# Patient Record
Sex: MALE | Race: BLACK OR AFRICAN AMERICAN | Employment: OTHER | URBAN - METROPOLITAN AREA
[De-identification: names, ages, dates, MRNs, and addresses within clinical notes are randomized per-mention and may not be internally consistent; named-entity substitution may affect disease eponyms.]

---

## 2022-11-28 ENCOUNTER — HOSPITAL ENCOUNTER (EMERGENCY)
Age: 62
Discharge: HOME | End: 2022-11-29
Attending: EMERGENCY MEDICINE
Payer: COMMERCIAL

## 2022-11-28 VITALS
DIASTOLIC BLOOD PRESSURE: 77 MMHG | RESPIRATION RATE: 18 BRPM | OXYGEN SATURATION: 100 % | WEIGHT: 283.9 LBS | SYSTOLIC BLOOD PRESSURE: 118 MMHG | HEART RATE: 99 BPM | TEMPERATURE: 97.5 F

## 2022-11-28 DIAGNOSIS — R11.2 NAUSEA AND VOMITING, UNSPECIFIED VOMITING TYPE: Primary | ICD-10-CM

## 2022-11-28 PROCEDURE — 96374 THER/PROPH/DIAG INJ IV PUSH: CPT

## 2022-11-28 PROCEDURE — 99284 EMERGENCY DEPT VISIT MOD MDM: CPT

## 2022-11-28 RX ORDER — AMLODIPINE BESYLATE 10 MG/1
TABLET ORAL
Status: ON HOLD | COMMUNITY

## 2022-11-28 RX ORDER — ALLOPURINOL 300 MG/1
TABLET ORAL
Status: ON HOLD | COMMUNITY

## 2022-11-28 ASSESSMENT — PAIN SCALES - GENERAL: PAINLEVEL_OUTOF10: 3

## 2022-11-28 ASSESSMENT — PAIN - FUNCTIONAL ASSESSMENT: PAIN_FUNCTIONAL_ASSESSMENT: 0-10

## 2022-11-28 ASSESSMENT — PAIN DESCRIPTION - LOCATION: LOCATION: ABDOMEN

## 2022-11-29 LAB
ABSOLUTE EOS #: 0.12 K/UL (ref 0–0.44)
ABSOLUTE IMMATURE GRANULOCYTE: 0.02 K/UL (ref 0–0.3)
ABSOLUTE LYMPH #: 1.25 K/UL (ref 1.1–3.7)
ABSOLUTE MONO #: 0.59 K/UL (ref 0.1–1.2)
ALBUMIN SERPL-MCNC: 3.6 G/DL (ref 3.5–5.2)
ALP BLD-CCNC: 72 U/L (ref 40–129)
ALT SERPL-CCNC: 17 U/L (ref 5–41)
ANION GAP SERPL CALCULATED.3IONS-SCNC: 9 MMOL/L (ref 9–17)
AST SERPL-CCNC: 21 U/L
BASOPHILS # BLD: 0 % (ref 0–2)
BASOPHILS ABSOLUTE: <0.03 K/UL (ref 0–0.2)
BILIRUB SERPL-MCNC: 0.5 MG/DL (ref 0.3–1.2)
BUN BLDV-MCNC: 11 MG/DL (ref 8–23)
BUN/CREAT BLD: 7 (ref 9–20)
CALCIUM SERPL-MCNC: 8.9 MG/DL (ref 8.6–10.4)
CHLORIDE BLD-SCNC: 99 MMOL/L (ref 98–107)
CO2: 26 MMOL/L (ref 20–31)
CREAT SERPL-MCNC: 1.61 MG/DL (ref 0.7–1.2)
EOSINOPHILS RELATIVE PERCENT: 3 % (ref 1–4)
GFR SERPL CREATININE-BSD FRML MDRD: 47 ML/MIN/1.73M2
GLUCOSE BLD-MCNC: 101 MG/DL (ref 70–99)
HCT VFR BLD CALC: 36.1 % (ref 40.7–50.3)
HEMOGLOBIN: 11.4 G/DL (ref 13–17)
IMMATURE GRANULOCYTES: 0 %
LACTIC ACID: 1 MMOL/L (ref 0.5–2.2)
LYMPHOCYTES # BLD: 28 % (ref 24–43)
MCH RBC QN AUTO: 29.6 PG (ref 25.2–33.5)
MCHC RBC AUTO-ENTMCNC: 31.6 G/DL (ref 28.4–34.8)
MCV RBC AUTO: 93.8 FL (ref 82.6–102.9)
MONOCYTES # BLD: 13 % (ref 3–12)
NRBC AUTOMATED: 0 PER 100 WBC
PDW BLD-RTO: 14.6 % (ref 11.8–14.4)
PLATELET # BLD: 302 K/UL (ref 138–453)
PMV BLD AUTO: 8.9 FL (ref 8.1–13.5)
POTASSIUM SERPL-SCNC: 3.9 MMOL/L (ref 3.7–5.3)
RBC # BLD: 3.85 M/UL (ref 4.21–5.77)
RBC # BLD: ABNORMAL 10*6/UL
SEG NEUTROPHILS: 56 % (ref 36–65)
SEGMENTED NEUTROPHILS ABSOLUTE COUNT: 2.55 K/UL (ref 1.5–8.1)
SODIUM BLD-SCNC: 134 MMOL/L (ref 135–144)
TOTAL PROTEIN: 7.1 G/DL (ref 6.4–8.3)
WBC # BLD: 4.6 K/UL (ref 3.5–11.3)

## 2022-11-29 PROCEDURE — 85025 COMPLETE CBC W/AUTO DIFF WBC: CPT

## 2022-11-29 PROCEDURE — 6360000002 HC RX W HCPCS: Performed by: EMERGENCY MEDICINE

## 2022-11-29 PROCEDURE — 83605 ASSAY OF LACTIC ACID: CPT

## 2022-11-29 PROCEDURE — 2580000003 HC RX 258: Performed by: EMERGENCY MEDICINE

## 2022-11-29 PROCEDURE — 80053 COMPREHEN METABOLIC PANEL: CPT

## 2022-11-29 RX ORDER — 0.9 % SODIUM CHLORIDE 0.9 %
500 INTRAVENOUS SOLUTION INTRAVENOUS ONCE
Status: COMPLETED | OUTPATIENT
Start: 2022-11-29 | End: 2022-11-29

## 2022-11-29 RX ORDER — ONDANSETRON 4 MG/1
4 TABLET, ORALLY DISINTEGRATING ORAL 3 TIMES DAILY PRN
Qty: 21 TABLET | Refills: 0 | Status: ON HOLD | OUTPATIENT
Start: 2022-11-29

## 2022-11-29 RX ORDER — ONDANSETRON 2 MG/ML
4 INJECTION INTRAMUSCULAR; INTRAVENOUS ONCE
Status: COMPLETED | OUTPATIENT
Start: 2022-11-29 | End: 2022-11-29

## 2022-11-29 RX ADMIN — ONDANSETRON 4 MG: 2 INJECTION INTRAMUSCULAR; INTRAVENOUS at 01:14

## 2022-11-29 RX ADMIN — SODIUM CHLORIDE 500 ML: 9 INJECTION, SOLUTION INTRAVENOUS at 01:02

## 2022-11-29 ASSESSMENT — ENCOUNTER SYMPTOMS
CONSTIPATION: 0
CHEST TIGHTNESS: 0
SINUS PAIN: 0
EYES NEGATIVE: 1
DIARRHEA: 0
SHORTNESS OF BREATH: 0
APNEA: 0
ABDOMINAL DISTENTION: 0
COLOR CHANGE: 0
VOMITING: 1

## 2022-11-29 NOTE — ED PROVIDER NOTES
EMERGENCY DEPARTMENT ENCOUNTER    Pt Name: Jeannine Leon  MRN: [id number]  [place] 1960  Date of evaluation: 11/29/22  CHIEF COMPLAINT       Chief Complaint   Patient presents with    Emesis     Since Tuesday. HISTORY OF PRESENT ILLNESS   56-year-old male presents emergency room for vomiting. Patient reports he has not been able to keep anything down for almost a week now. He reports no matter what he eats he throws it up several minutes later. Patient reports history of high blood pressure no other major medical problems. He does not have diabetes. He is not having any abdominal pain. No fevers. He just cannot keep anything down. No diarrhea. REVIEW OF SYSTEMS     Review of Systems   Constitutional:  Negative for activity change, chills and diaphoresis. HENT:  Negative for congestion, sinus pain and tinnitus. Eyes: Negative. Respiratory:  Negative for apnea, chest tightness and shortness of breath. Gastrointestinal:  Positive for vomiting. Negative for abdominal distention, constipation and diarrhea. Genitourinary:  Negative for difficulty urinating and frequency. Musculoskeletal:  Negative for arthralgias and myalgias. Skin:  Negative for color change and rash. Neurological:  Negative for dizziness. Hematological: Negative. Psychiatric/Behavioral: Negative. PASTMEDICAL HISTORY     Past Medical History:   Diagnosis Date    Gout     Hypertension      Past Problem List  There is no problem list on file for this patient. SURGICAL HISTORY     History reviewed. No pertinent surgical history. CURRENT MEDICATIONS       Previous Medications    ALLOPURINOL (ZYLOPRIM) 300 MG TABLET    allopurinol 300 mg tablet    AMLODIPINE (NORVASC) 10 MG TABLET    amlodipine 10 mg tablet     ALLERGIES     has No Known Allergies. FAMILY HISTORY     has no family status information on file.       SOCIAL HISTORY       Social History     Tobacco Use    Smoking status: Never Smokeless tobacco: Never   Substance Use Topics    Alcohol use: Yes     Alcohol/week: 34.0 standard drinks     Types: 14 Cans of beer, 20 Shots of liquor per week    Drug use: Yes     Frequency: 1.0 times per week     Types: Marijuana (Weed)     PHYSICAL EXAM     INITIAL VITALS: /77   Pulse 99   Temp 97.5 °F (36.4 °C) (Temporal)   Resp 18   Wt 283 lb 14.4 oz (128.8 kg)   SpO2 100%    Physical Exam  Constitutional:       General: He is not in acute distress. Appearance: He is well-developed. HENT:      Head: Normocephalic. Eyes:      Pupils: Pupils are equal, round, and reactive to light. Cardiovascular:      Rate and Rhythm: Normal rate and regular rhythm. Heart sounds: Normal heart sounds. Pulmonary:      Effort: Pulmonary effort is normal. No respiratory distress. Breath sounds: Normal breath sounds. Abdominal:      General: Bowel sounds are normal.      Palpations: Abdomen is soft. Tenderness: There is no abdominal tenderness. Musculoskeletal:         General: Normal range of motion. Skin:     General: Skin is warm and dry. Neurological:      Mental Status: He is alert and oriented to person, place, and time. MEDICAL DECISION MAKIN-year-old male presenting to the emergency room for vomiting without abdominal pain. Patient does not have any abdominal tenderness. He has very mild YECENIA with creatinine of 1.61 and GFR of 47. Electrolytes are within acceptable limits. Given lack of abdominal pain CT imaging unlikely to provide additional information. Patient's liver enzymes and bilirubin are normal.  Patient tolerating fluids. We discussed clear fluids over the next 48 hours and reevaluation if symptoms do not improve after this.     CRITICAL CARE:       PROCEDURES:    Procedures    DIAGNOSTIC RESULTS   EKG:All EKG's are interpreted by the Emergency Department Physician who either signs or Co-signs this chart in the absence of a cardiologist.        RADIOLOGY:All plain film, CT, MRI, and formal ultrasound images (except ED bedside ultrasound) are read by the radiologist, see reports below, unless otherwisenoted in MDM or here. No orders to display     LABS: All lab results were reviewed by myself, and all abnormals are listed below. Labs Reviewed   CBC WITH AUTO DIFFERENTIAL - Abnormal; Notable for the following components:       Result Value    RBC 3.85 (*)     Hemoglobin 11.4 (*)     Hematocrit 36.1 (*)     RDW 14.6 (*)     Monocytes 13 (*)     All other components within normal limits   COMPREHENSIVE METABOLIC PANEL - Abnormal; Notable for the following components:    Glucose 101 (*)     Creatinine 1.61 (*)     Est, Glom Filt Rate 47 (*)     Bun/Cre Ratio 7 (*)     Sodium 134 (*)     All other components within normal limits   LACTIC ACID       EMERGENCY DEPARTMENTCOURSE:         Vitals:    Vitals:    11/28/22 2122 11/28/22 2128   BP: 118/77    Pulse: 99    Resp: 18    Temp: 97.5 °F (36.4 °C)    TempSrc: Temporal    SpO2: 100%    Weight:  283 lb 14.4 oz (128.8 kg)       The patient was given the following medications while in the emergency department:  Orders Placed This Encounter   Medications    0.9 % sodium chloride bolus    ondansetron (ZOFRAN) injection 4 mg    ondansetron (ZOFRAN-ODT) 4 MG disintegrating tablet     Sig: Take 1 tablet by mouth 3 times daily as needed for Nausea or Vomiting     Dispense:  21 tablet     Refill:  0     CONSULTS:  None    FINAL IMPRESSION      1. Nausea and vomiting, unspecified vomiting type          DISPOSITION/PLAN   DISPOSITION Decision To Discharge 11/29/2022 03:37:34 AM      PATIENT REFERRED TO:  No follow-up provider specified.   DISCHARGE MEDICATIONS:  New Prescriptions    ONDANSETRON (ZOFRAN-ODT) 4 MG DISINTEGRATING TABLET    Take 1 tablet by mouth 3 times daily as needed for Nausea or Vomiting     Lily Post MD  Attending Emergency Physician      Care during this encounter was due to an unprecedented national emergency due to COVID-19.              Tiki Bishop MD  11/29/22 0121

## 2022-11-29 NOTE — DISCHARGE INSTRUCTIONS
As we discussed I recommend sticking to clear fluids today. You may use apple juice Jell-O popsicles Gatorade chicken broth etc.  If appetite returns you may start with very light soup or crackers or toast.  Advance diet slowly. If you are unable to take in any solids in 3 days I recommend reevaluation.

## 2022-12-04 ENCOUNTER — APPOINTMENT (OUTPATIENT)
Dept: CT IMAGING | Age: 62
DRG: 469 | End: 2022-12-04
Payer: COMMERCIAL

## 2022-12-04 ENCOUNTER — HOSPITAL ENCOUNTER (INPATIENT)
Age: 62
LOS: 2 days | Discharge: HOME | DRG: 469 | End: 2022-12-06
Attending: EMERGENCY MEDICINE | Admitting: INTERNAL MEDICINE
Payer: COMMERCIAL

## 2022-12-04 DIAGNOSIS — N17.9 ACUTE KIDNEY INJURY (HCC): ICD-10-CM

## 2022-12-04 DIAGNOSIS — R11.2 NAUSEA AND VOMITING, UNSPECIFIED VOMITING TYPE: Primary | ICD-10-CM

## 2022-12-04 PROBLEM — E86.0 DEHYDRATION: Status: ACTIVE | Noted: 2022-12-04

## 2022-12-04 PROBLEM — M10.9 GOUT: Status: ACTIVE | Noted: 2020-09-04

## 2022-12-04 PROBLEM — I10 HYPERTENSIVE DISORDER: Status: ACTIVE | Noted: 2017-11-28

## 2022-12-04 PROBLEM — F10.10 ALCOHOL ABUSE: Chronic | Status: ACTIVE | Noted: 2022-12-04

## 2022-12-04 LAB
ABSOLUTE EOS #: <0.03 K/UL (ref 0–0.44)
ABSOLUTE IMMATURE GRANULOCYTE: 0.01 K/UL (ref 0–0.3)
ABSOLUTE LYMPH #: 0.89 K/UL (ref 1.1–3.7)
ABSOLUTE MONO #: 0.88 K/UL (ref 0.1–1.2)
ALBUMIN SERPL-MCNC: 3.2 G/DL (ref 3.5–5.2)
ALP BLD-CCNC: 69 U/L (ref 40–129)
ALT SERPL-CCNC: 19 U/L (ref 5–41)
AMYLASE: 187 U/L (ref 28–100)
ANION GAP SERPL CALCULATED.3IONS-SCNC: 11 MMOL/L (ref 9–17)
AST SERPL-CCNC: 42 U/L
BASOPHILS # BLD: 0 % (ref 0–2)
BASOPHILS ABSOLUTE: <0.03 K/UL (ref 0–0.2)
BILIRUB SERPL-MCNC: 0.3 MG/DL (ref 0.3–1.2)
BILIRUBIN DIRECT: 0.1 MG/DL
BILIRUBIN URINE: ABNORMAL
BILIRUBIN, INDIRECT: 0.2 MG/DL (ref 0–1)
BUN BLDV-MCNC: 12 MG/DL (ref 8–23)
BUN/CREAT BLD: 7 (ref 9–20)
CALCIUM SERPL-MCNC: 8.2 MG/DL (ref 8.6–10.4)
CASTS UA: ABNORMAL /LPF
CASTS UA: ABNORMAL /LPF
CHLORIDE BLD-SCNC: 99 MMOL/L (ref 98–107)
CO2: 25 MMOL/L (ref 20–31)
COLOR: YELLOW
CREAT SERPL-MCNC: 1.79 MG/DL (ref 0.7–1.2)
EOSINOPHILS RELATIVE PERCENT: 0 % (ref 1–4)
EPITHELIAL CELLS UA: ABNORMAL /HPF (ref 0–5)
ETHANOL PERCENT: <0.01 %
ETHANOL: <10 MG/DL
GFR SERPL CREATININE-BSD FRML MDRD: 42 ML/MIN/1.73M2
GLUCOSE BLD-MCNC: 104 MG/DL (ref 70–99)
GLUCOSE URINE: NEGATIVE
HCT VFR BLD CALC: 35.5 % (ref 40.7–50.3)
HEMOGLOBIN: 11.3 G/DL (ref 13–17)
IMMATURE GRANULOCYTES: 0 %
KETONES, URINE: ABNORMAL
LEUKOCYTE ESTERASE, URINE: NEGATIVE
LIPASE: 47 U/L (ref 13–60)
LYMPHOCYTES # BLD: 22 % (ref 24–43)
MCH RBC QN AUTO: 29.8 PG (ref 25.2–33.5)
MCHC RBC AUTO-ENTMCNC: 31.8 G/DL (ref 28.4–34.8)
MCV RBC AUTO: 93.7 FL (ref 82.6–102.9)
MONOCYTES # BLD: 22 % (ref 3–12)
MUCUS: ABNORMAL
NITRITE, URINE: NEGATIVE
NRBC AUTOMATED: 0 PER 100 WBC
PDW BLD-RTO: 14.9 % (ref 11.8–14.4)
PH UA: 5.5 (ref 5–8)
PLATELET # BLD: 215 K/UL (ref 138–453)
PMV BLD AUTO: 9.5 FL (ref 8.1–13.5)
POTASSIUM SERPL-SCNC: 4.2 MMOL/L (ref 3.7–5.3)
PROTEIN UA: ABNORMAL
RBC # BLD: 3.79 M/UL (ref 4.21–5.77)
RBC # BLD: ABNORMAL 10*6/UL
RBC UA: ABNORMAL /HPF (ref 0–2)
REASON FOR REJECTION: NORMAL
SEG NEUTROPHILS: 56 % (ref 36–65)
SEGMENTED NEUTROPHILS ABSOLUTE COUNT: 2.21 K/UL (ref 1.5–8.1)
SODIUM BLD-SCNC: 135 MMOL/L (ref 135–144)
SPECIFIC GRAVITY UA: 1.02 (ref 1–1.03)
TOTAL PROTEIN: 6.6 G/DL (ref 6.4–8.3)
TURBIDITY: ABNORMAL
URINE HGB: ABNORMAL
UROBILINOGEN, URINE: NORMAL
WBC # BLD: 4 K/UL (ref 3.5–11.3)
WBC UA: ABNORMAL /HPF (ref 0–5)
ZZ NTE CLEAN UP: ORDERED TEST: NORMAL
ZZ NTE WITH NAME CLEAN UP: SPECIMEN SOURCE: NORMAL

## 2022-12-04 PROCEDURE — 96361 HYDRATE IV INFUSION ADD-ON: CPT

## 2022-12-04 PROCEDURE — 80076 HEPATIC FUNCTION PANEL: CPT

## 2022-12-04 PROCEDURE — 80048 BASIC METABOLIC PNL TOTAL CA: CPT

## 2022-12-04 PROCEDURE — 6360000002 HC RX W HCPCS: Performed by: NURSE PRACTITIONER

## 2022-12-04 PROCEDURE — 83690 ASSAY OF LIPASE: CPT

## 2022-12-04 PROCEDURE — 2580000003 HC RX 258: Performed by: NURSE PRACTITIONER

## 2022-12-04 PROCEDURE — G0480 DRUG TEST DEF 1-7 CLASSES: HCPCS

## 2022-12-04 PROCEDURE — 2580000003 HC RX 258: Performed by: EMERGENCY MEDICINE

## 2022-12-04 PROCEDURE — 2500000003 HC RX 250 WO HCPCS: Performed by: NURSE PRACTITIONER

## 2022-12-04 PROCEDURE — 99222 1ST HOSP IP/OBS MODERATE 55: CPT | Performed by: NURSE PRACTITIONER

## 2022-12-04 PROCEDURE — 81001 URINALYSIS AUTO W/SCOPE: CPT

## 2022-12-04 PROCEDURE — 6360000002 HC RX W HCPCS: Performed by: EMERGENCY MEDICINE

## 2022-12-04 PROCEDURE — 84300 ASSAY OF URINE SODIUM: CPT

## 2022-12-04 PROCEDURE — 96374 THER/PROPH/DIAG INJ IV PUSH: CPT

## 2022-12-04 PROCEDURE — 1200000000 HC SEMI PRIVATE

## 2022-12-04 PROCEDURE — 85025 COMPLETE CBC W/AUTO DIFF WBC: CPT

## 2022-12-04 PROCEDURE — 74176 CT ABD & PELVIS W/O CONTRAST: CPT

## 2022-12-04 PROCEDURE — 82150 ASSAY OF AMYLASE: CPT

## 2022-12-04 PROCEDURE — 99285 EMERGENCY DEPT VISIT HI MDM: CPT

## 2022-12-04 RX ORDER — ACETAMINOPHEN 325 MG/1
650 TABLET ORAL EVERY 6 HOURS PRN
Status: DISCONTINUED | OUTPATIENT
Start: 2022-12-04 | End: 2022-12-06 | Stop reason: HOSPADM

## 2022-12-04 RX ORDER — SODIUM CHLORIDE 0.9 % (FLUSH) 0.9 %
5-40 SYRINGE (ML) INJECTION EVERY 12 HOURS SCHEDULED
Status: DISCONTINUED | OUTPATIENT
Start: 2022-12-04 | End: 2022-12-06 | Stop reason: HOSPADM

## 2022-12-04 RX ORDER — POTASSIUM CHLORIDE 7.45 MG/ML
10 INJECTION INTRAVENOUS PRN
Status: DISCONTINUED | OUTPATIENT
Start: 2022-12-04 | End: 2022-12-06 | Stop reason: HOSPADM

## 2022-12-04 RX ORDER — ONDANSETRON 4 MG/1
4 TABLET, ORALLY DISINTEGRATING ORAL EVERY 8 HOURS PRN
Status: DISCONTINUED | OUTPATIENT
Start: 2022-12-04 | End: 2022-12-06 | Stop reason: HOSPADM

## 2022-12-04 RX ORDER — MAGNESIUM SULFATE 1 G/100ML
1000 INJECTION INTRAVENOUS PRN
Status: DISCONTINUED | OUTPATIENT
Start: 2022-12-04 | End: 2022-12-06 | Stop reason: HOSPADM

## 2022-12-04 RX ORDER — ACETAMINOPHEN 650 MG/1
650 SUPPOSITORY RECTAL EVERY 6 HOURS PRN
Status: DISCONTINUED | OUTPATIENT
Start: 2022-12-04 | End: 2022-12-06 | Stop reason: HOSPADM

## 2022-12-04 RX ORDER — SODIUM CHLORIDE 9 MG/ML
INJECTION, SOLUTION INTRAVENOUS CONTINUOUS
Status: DISCONTINUED | OUTPATIENT
Start: 2022-12-04 | End: 2022-12-06

## 2022-12-04 RX ORDER — POLYETHYLENE GLYCOL 3350 17 G/17G
17 POWDER, FOR SOLUTION ORAL DAILY PRN
Status: DISCONTINUED | OUTPATIENT
Start: 2022-12-04 | End: 2022-12-06 | Stop reason: HOSPADM

## 2022-12-04 RX ORDER — HYDRALAZINE HYDROCHLORIDE 20 MG/ML
5 INJECTION INTRAMUSCULAR; INTRAVENOUS EVERY 6 HOURS PRN
Status: DISCONTINUED | OUTPATIENT
Start: 2022-12-04 | End: 2022-12-05

## 2022-12-04 RX ORDER — METOCLOPRAMIDE HYDROCHLORIDE 5 MG/ML
5 INJECTION INTRAMUSCULAR; INTRAVENOUS EVERY 6 HOURS
Status: DISCONTINUED | OUTPATIENT
Start: 2022-12-04 | End: 2022-12-05

## 2022-12-04 RX ORDER — SODIUM CHLORIDE 0.9 % (FLUSH) 0.9 %
10 SYRINGE (ML) INJECTION PRN
Status: DISCONTINUED | OUTPATIENT
Start: 2022-12-04 | End: 2022-12-06 | Stop reason: HOSPADM

## 2022-12-04 RX ORDER — DICYCLOMINE HYDROCHLORIDE 10 MG/ML
10 INJECTION INTRAMUSCULAR 4 TIMES DAILY PRN
Status: DISCONTINUED | OUTPATIENT
Start: 2022-12-04 | End: 2022-12-06 | Stop reason: HOSPADM

## 2022-12-04 RX ORDER — SODIUM CHLORIDE 9 MG/ML
INJECTION, SOLUTION INTRAVENOUS CONTINUOUS
Status: DISCONTINUED | OUTPATIENT
Start: 2022-12-04 | End: 2022-12-04 | Stop reason: DRUGHIGH

## 2022-12-04 RX ORDER — ONDANSETRON 2 MG/ML
4 INJECTION INTRAMUSCULAR; INTRAVENOUS ONCE
Status: COMPLETED | OUTPATIENT
Start: 2022-12-04 | End: 2022-12-04

## 2022-12-04 RX ORDER — POTASSIUM CHLORIDE 20 MEQ/1
40 TABLET, EXTENDED RELEASE ORAL PRN
Status: DISCONTINUED | OUTPATIENT
Start: 2022-12-04 | End: 2022-12-06 | Stop reason: HOSPADM

## 2022-12-04 RX ORDER — HEPARIN SODIUM 5000 [USP'U]/ML
5000 INJECTION, SOLUTION INTRAVENOUS; SUBCUTANEOUS EVERY 8 HOURS SCHEDULED
Status: DISCONTINUED | OUTPATIENT
Start: 2022-12-04 | End: 2022-12-06 | Stop reason: HOSPADM

## 2022-12-04 RX ORDER — SODIUM CHLORIDE 9 MG/ML
INJECTION, SOLUTION INTRAVENOUS PRN
Status: DISCONTINUED | OUTPATIENT
Start: 2022-12-04 | End: 2022-12-06 | Stop reason: HOSPADM

## 2022-12-04 RX ORDER — ONDANSETRON 2 MG/ML
4 INJECTION INTRAMUSCULAR; INTRAVENOUS EVERY 6 HOURS PRN
Status: DISCONTINUED | OUTPATIENT
Start: 2022-12-04 | End: 2022-12-06 | Stop reason: HOSPADM

## 2022-12-04 RX ADMIN — SODIUM CHLORIDE, PRESERVATIVE FREE 20 MG: 5 INJECTION INTRAVENOUS at 21:40

## 2022-12-04 RX ADMIN — ONDANSETRON 4 MG: 2 INJECTION INTRAMUSCULAR; INTRAVENOUS at 14:40

## 2022-12-04 RX ADMIN — HEPARIN SODIUM 5000 UNITS: 5000 INJECTION INTRAVENOUS; SUBCUTANEOUS at 21:41

## 2022-12-04 RX ADMIN — METOCLOPRAMIDE 5 MG: 5 INJECTION, SOLUTION INTRAMUSCULAR; INTRAVENOUS at 21:39

## 2022-12-04 RX ADMIN — THIAMINE HYDROCHLORIDE: 100 INJECTION, SOLUTION INTRAMUSCULAR; INTRAVENOUS at 19:50

## 2022-12-04 RX ADMIN — SODIUM CHLORIDE: 9 INJECTION, SOLUTION INTRAVENOUS at 14:38

## 2022-12-04 RX ADMIN — SODIUM CHLORIDE: 9 INJECTION, SOLUTION INTRAVENOUS at 19:53

## 2022-12-04 ASSESSMENT — ENCOUNTER SYMPTOMS
FACIAL SWELLING: 0
VOMITING: 1
ABDOMINAL PAIN: 1
COUGH: 0
DIARRHEA: 0
CONSTIPATION: 0
SHORTNESS OF BREATH: 0
COLOR CHANGE: 0
EYE DISCHARGE: 0
EYE REDNESS: 0
NAUSEA: 1

## 2022-12-04 ASSESSMENT — PAIN SCALES - GENERAL
PAINLEVEL_OUTOF10: 0
PAINLEVEL_OUTOF10: 7

## 2022-12-04 ASSESSMENT — PAIN - FUNCTIONAL ASSESSMENT: PAIN_FUNCTIONAL_ASSESSMENT: 0-10

## 2022-12-04 NOTE — H&P
[facility]  Office: [address], DO, James Velez, DO, Negrito Ruelas, DO, Troy Bone, DO, Mamie Foster MD, Makenzie Mg MD, Lm Calvo MD, Tabby Hogan MD,  Nathalia Gavin MD, Unruly Caicedo MD, Joan Tamez DO, Rochelle Lee MD,  Raquel Sanon MD, Jeanette Barbosa MD, Michelle Garcia, DO, Candis Cortes MD, Amie Iglesias MD, Nishi López, DO, Christy Petersen MD, Ronel Farmer MD, Sudhir Contreras MD, Janette Cabezas MD, Yadira Diaz, DO, Tracy Flores MD, Moncho Luong MD, Yareli Singh, CNP,  Gt Cavanaugh, CNP, Tory Leija, CNP, Rachael Garvin, CNP,  Jean Moore, DNP, Lizz Blas, CNP, Oliver Wilkinson, CNP, Magdy Zimmer, CNP, Andriy Cantu, CNP, Darci Yao, CNP, Maite Lopez PA-C, Jesus Elena, CNS, Lynn Gutierrez, CNP, Quynh Powell, [facility]    HISTORY AND PHYSICAL EXAMINATION            Date:   2022  Patient name:  Maria R Eric  Date of admission:  2022  2:09 PM  MRN:   [id number]  Account:  [de-identified]  YOB: 1960  PCP:    No primary care provider on file. Room:   [place] [address]  Code Status:    No Order    Chief Complaint:     Chief Complaint   Patient presents with    Nausea     Pt states he has been having N/V for 2 weeks. He was seen last week here for the same complaint.  Emesis     No emesis for triage RN at this time. History Obtained From:     patient, electronic medical record    History of Present Illness:     Maria R Eric is a 57 y.o. Non- / non  male who presents with Nausea (Pt states he has been having N/V for 2 weeks. He was seen last week here for the same complaint. ) and Emesis (No emesis for triage RN at this time. )   and is admitted to the hospital for the management of YECENIA (acute kidney injury) ([facility] [address] Patient presented to the ER  with complaints of vomiting.   At that time he reported that he was not able to keep anything down for about a week. At that time he had a mild YECENIA of 1.61 but his electrolytes are within normal limits. CT was unremarkable. Greater than notes the patient was given fluids and Zofran. He was tolerating fluids and discharged home. Today he returns with continued nausea and vomiting with occasional abdominal pain. He denies fever, constipation or diarrhea. He had a normal BM today. He was seen by his PCP a couple of days ago and they gave him Zofran and was told to stop taking his gout medication until his N/V have improved. He has a history of alcohol abuse but states he has cut back recently and his last drink was 5 days ago. CT remains unremarkable  Creatine up to 1.79  UA has 2+ protein     Past Medical History:     Past Medical History:   Diagnosis Date    Gout     Hypertension         Past Surgical History:     History reviewed. No pertinent surgical history. Medications Prior to Admission:     Prior to Admission medications    Medication Sig Start Date End Date Taking? Authorizing Provider   ondansetron (ZOFRAN-ODT) 4 MG disintegrating tablet Take 1 tablet by mouth 3 times daily as needed for Nausea or Vomiting 11/29/22   Isabell Aguilar MD   allopurinol (ZYLOPRIM) 300 MG tablet allopurinol 300 mg tablet    Historical Provider, MD   amLODIPine (NORVASC) 10 MG tablet amlodipine 10 mg tablet    Historical Provider, MD        Allergies:     Patient has no known allergies. Social History:     Tobacco:    reports that he has never smoked. He has never used smokeless tobacco.  Alcohol:      reports that he does not currently use alcohol. Drug Use:  reports current drug use. Frequency: 1.00 time per week. Drug: Marijuana Barbara Ferrari).     Family History:     Family History   Problem Relation Age of Onset   [de-identified] Cancer Mother     Alcohol Abuse Father     Liver Disease Father     Diabetes Sister        Review of Systems:     Positive and Negative as described in HPI. Review of Systems   Constitutional:  Positive for appetite change. Gastrointestinal:  Positive for abdominal pain (occasional cramping along the lower abdomen), nausea and vomiting. Normal BM this morning   All other systems reviewed and are negative. Physical Exam:   /70   Pulse (!) 106   Temp 98.6 °F (37 °C) (Oral)   Resp 18   Ht 5' 11\" (1.803 m)   Wt 288 lb (130.6 kg)   SpO2 99%   BMI 40.17 kg/m²   Temp (24hrs), Av.6 °F (37 °C), Min:98.6 °F (37 °C), Max:98.6 °F (37 °C)    No results for input(s): POCGLU in the last 72 hours. No intake or output data in the 24 hours ending 22    Physical Exam  Vitals and nursing note reviewed. HENT:      Mouth/Throat:      Mouth: Mucous membranes are dry. Eyes:      Extraocular Movements: Extraocular movements intact. Conjunctiva/sclera: Conjunctivae normal.   Cardiovascular:      Rate and Rhythm: Normal rate and regular rhythm. Pulses: Normal pulses. Heart sounds: Normal heart sounds. Pulmonary:      Effort: Pulmonary effort is normal.      Breath sounds: Normal breath sounds. Abdominal:      General: Bowel sounds are normal.      Palpations: Abdomen is soft. Musculoskeletal:         General: Normal range of motion. Right lower leg: No edema. Left lower leg: No edema. Skin:     General: Skin is warm. Capillary Refill: Capillary refill takes less than 2 seconds. Neurological:      Mental Status: He is alert and oriented to person, place, and time.    Psychiatric:         Behavior: Behavior normal.       Investigations:      Laboratory Testing:  Recent Results (from the past 24 hour(s))   CBC with Auto Differential    Collection Time: 22  2:30 PM   Result Value Ref Range    WBC 4.0 3.5 - 11.3 k/uL    RBC 3.79 (L) 4.21 - 5.77 m/uL    Hemoglobin 11.3 (L) 13.0 - 17.0 g/dL    Hematocrit 35.5 (L) 40.7 - 50.3 %    MCV 93.7 82.6 - 102.9 fL    MCH 29.8 25.2 - 33.5 pg MCHC 31.8 28.4 - 34.8 g/dL    RDW 14.9 (H) 11.8 - 14.4 %    Platelets 645 868 - 460 k/uL    MPV 9.5 8.1 - 13.5 fL    NRBC Automated 0.0 0.0 per 100 WBC    Seg Neutrophils 56 36 - 65 %    Lymphocytes 22 (L) 24 - 43 %    Monocytes 22 (H) 3 - 12 %    Eosinophils % 0 (L) 1 - 4 %    Basophils 0 0 - 2 %    Immature Granulocytes 0 0 %    Segs Absolute 2.21 1.50 - 8.10 k/uL    Absolute Lymph # 0.89 (L) 1.10 - 3.70 k/uL    Absolute Mono # 0.88 0.10 - 1.20 k/uL    Absolute Eos # <0.03 0.00 - 0.44 k/uL    Basophils Absolute <0.03 0.00 - 0.20 k/uL    Absolute Immature Granulocyte 0.01 0.00 - 0.30 k/uL    RBC Morphology ANISOCYTOSIS PRESENT    SPECIMEN REJECTION    Collection Time: 12/04/22  2:30 PM   Result Value Ref Range    Specimen Source . BLOOD     Ordered Test KUNAL BMP LIP LIVP     Reason for Rejection Unable to perform testing: Specimen hemolyzed.     Amylase    Collection Time: 12/04/22  3:09 PM   Result Value Ref Range    Amylase 187 (H) 28 - 100 U/L   Basic Metabolic Panel    Collection Time: 12/04/22  3:09 PM   Result Value Ref Range    Glucose 104 (H) 70 - 99 mg/dL    BUN 12 8 - 23 mg/dL    Creatinine 1.79 (H) 0.70 - 1.20 mg/dL    Est, Glom Filt Rate 42 (L) >60 mL/min/1.73m2    Bun/Cre Ratio 7 (L) 9 - 20    Calcium 8.2 (L) 8.6 - 10.4 mg/dL    Sodium 135 135 - 144 mmol/L    Potassium 4.2 3.7 - 5.3 mmol/L    Chloride 99 98 - 107 mmol/L    CO2 25 20 - 31 mmol/L    Anion Gap 11 9 - 17 mmol/L   Lipase    Collection Time: 12/04/22  3:09 PM   Result Value Ref Range    Lipase 47 13 - 60 U/L   Hepatic Function Panel    Collection Time: 12/04/22  3:09 PM   Result Value Ref Range    Albumin 3.2 (L) 3.5 - 5.2 g/dL    Alkaline Phosphatase 69 40 - 129 U/L    ALT 19 5 - 41 U/L    AST 42 (H) <40 U/L    Total Bilirubin 0.3 0.3 - 1.2 mg/dL    Bilirubin, Direct 0.1 <0.3 mg/dL    Bilirubin, Indirect 0.2 0.0 - 1.0 mg/dL    Total Protein 6.6 6.4 - 8.3 g/dL   Ethanol    Collection Time: 12/04/22  3:09 PM   Result Value Ref Range Ethanol <10 <10 mg/dL    Ethanol percent <0.010 <0.010 %   Urinalysis    Collection Time: 12/04/22  3:40 PM   Result Value Ref Range    Color, UA Yellow Yellow    Turbidity UA SLIGHTLY CLOUDY (A) Clear    Glucose, Ur NEGATIVE NEGATIVE    Bilirubin Urine (A) NEGATIVE     Presumptive positive. Unable to confirm due to unavailability of reagent. Ketones, Urine 1+ (A) NEGATIVE    Specific Gravity, UA 1.025 1.005 - 1.030    Urine Hgb TRACE (A) NEGATIVE    pH, UA 5.5 5.0 - 8.0    Protein, UA 2+ (A) NEGATIVE    Urobilinogen, Urine Normal Normal    Nitrite, Urine NEGATIVE NEGATIVE    Leukocyte Esterase, Urine NEGATIVE NEGATIVE   Microscopic Urinalysis    Collection Time: 12/04/22  3:40 PM   Result Value Ref Range    WBC, UA 0 TO 2 0 - 5 /HPF    RBC, UA 2 TO 5 0 - 2 /HPF    Casts UA 20 TO 50 /LPF    Casts UA HYALINE /LPF    Epithelial Cells UA 2 TO 5 0 - 5 /HPF    Mucus, UA 3+ (A) None       Imaging/Diagnostics:  CT ABDOMEN PELVIS WO CONTRAST Additional Contrast? None    Result Date: 12/4/2022  No acute intra-abdominal or intrapelvic abnormalities are noted. RECOMMENDATIONS: Based on MIPS measure 405, incidental abd lesions in this patient population do not warrant F/U W/O a documented medical reason. .       Assessment :      Hospital Problems             Last Modified POA    * (Principal) YECENIA (acute kidney injury) ([facility] [address] 12/4/2022 Yes    Hypertensive disorder 12/4/2022 Yes    Dehydration 12/4/2022 Yes    Alcohol abuse (Chronic) 12/4/2022 Yes       Plan:     Patient status inpatient in the  Med/Surge    YECENIA related to dehydration  IV fluids  Scheduled Reglan and Pepcid  Bentyl for cramping as needed  Monitor I&O and renal function  Repeat LFT's in am  Avoid hypotension   [place] diet    Alcohol abuse  Folic acid and thiamine IV daily  No alcohol in 5 days  Monitor for seizure/fall  Encourage alcohol cessation    Hypertension  Resume Norvasc when tolerating po  Hydralazine IV prn    Heparin for DVT prophylaxis        Consultations:   IP CONSULT TO HOSPITALIST    Patient is admitted as inpatient status because of co-morbidities listed above, severity of signs and symptoms as outlined, requirement for current medical therapies and most importantly because of direct risk to patient if care not provided in a hospital setting. Expected length of stay > 48 hours. KYLE Hernandez CNP  12/4/2022  6:15 PM    Copy sent to Dr. Neil Mullins primary care provider on file.

## 2022-12-04 NOTE — ED PROVIDER NOTES
[facility] ED  EMERGENCY DEPARTMENT ENCOUNTER      Pt Name: Ana Boswell  MRN: [id number]  [place] 1960  Date of evaluation: 12/4/2022  Provider: MD Mony Wade       Chief Complaint   Patient presents with    Nausea     Pt states he has been having N/V for 2 weeks. He was seen last week here for the same complaint. Emesis     No emesis for triage RN at this time. HISTORY OF PRESENT ILLNESS  (Location/Symptom, Timing/Onset, Context/Setting, Quality, Duration, Modifying Factors, Severity.)   Ana Boswell is a 57 y.o. male who presents to the emergency department for nausea and vomiting. He has been having this for about 2 weeks and is not getting better. He was seen in this emergency department and had blood work done. He followed up with his family doctor a few days ago. He continues to be unable to eat. Sometimes his stomach hurts but its not hurting right now. No fever constipation or diarrhea. Nursing Notes were reviewed. ALLERGIES     Patient has no known allergies. CURRENT MEDICATIONS       Previous Medications    ALLOPURINOL (ZYLOPRIM) 300 MG TABLET    allopurinol 300 mg tablet    AMLODIPINE (NORVASC) 10 MG TABLET    amlodipine 10 mg tablet    ONDANSETRON (ZOFRAN-ODT) 4 MG DISINTEGRATING TABLET    Take 1 tablet by mouth 3 times daily as needed for Nausea or Vomiting       PAST MEDICAL HISTORY         Diagnosis Date    Gout     Hypertension        SURGICAL HISTORY     History reviewed. No pertinent surgical history. FAMILY HISTORY     History reviewed. No pertinent family history. No family status information on file. SOCIAL HISTORY      reports that he has never smoked. He has never used smokeless tobacco. He reports current alcohol use of about 34.0 standard drinks per week. He reports current drug use. Frequency: 1.00 time per week. Drug: Marijuana [employer]).     REVIEW OF SYSTEMS    (2-9 systems for level 4, 10 or more for level 5)     Review of Systems   Constitutional:  Negative for chills, fatigue and fever. HENT:  Negative for congestion, ear discharge and facial swelling. Eyes:  Negative for discharge and redness. Respiratory:  Negative for cough and shortness of breath. Cardiovascular:  Negative for chest pain. Gastrointestinal:  Positive for abdominal pain, nausea and vomiting. Negative for constipation and diarrhea. Genitourinary:  Negative for dysuria and hematuria. Musculoskeletal:  Negative for arthralgias. Skin:  Negative for color change and rash. Neurological:  Negative for syncope, numbness and headaches. Hematological:  Negative for adenopathy. Psychiatric/Behavioral:  Negative for confusion. The patient is not nervous/anxious. Except as noted above the remainder of the review of systems was reviewed and negative. PHYSICAL EXAM    (up to 7 for level 4, 8 or more for level 5)     Vitals:    12/04/22 1338   BP: 121/70   Pulse: (!) 106   Resp: 18   Temp: 98.6 °F (37 °C)   TempSrc: Oral   SpO2: 99%   Weight: 288 lb (130.6 kg)   Height: 5' 11\" (1.803 m)       Physical Exam  Vitals reviewed. Constitutional:       General: He is not in acute distress. Appearance: He is well-developed. He is not diaphoretic. HENT:      Head: Normocephalic and atraumatic. Eyes:      General: No scleral icterus. Right eye: No discharge. Left eye: No discharge. Cardiovascular:      Rate and Rhythm: Normal rate and regular rhythm. Pulmonary:      Effort: Pulmonary effort is normal. No respiratory distress. Breath sounds: Normal breath sounds. No stridor. No wheezing or rales. Abdominal:      General: There is no distension. Palpations: Abdomen is soft. Tenderness: There is no abdominal tenderness. Musculoskeletal:         General: Normal range of motion. Cervical back: Neck supple. Lymphadenopathy:      Cervical: No cervical adenopathy.    Skin:     General: Skin is warm and dry. Findings: No erythema or rash. Neurological:      Mental Status: He is alert and oriented to person, place, and time. Psychiatric:         Behavior: Behavior normal.           DIAGNOSTIC RESULTS     EKG: All EKG's are interpreted by the Emergency Department Physician who either signs or Co-signs this chart in the absence of a cardiologist.    RADIOLOGY:   Non-plain film images such as CT, Ultrasound and MRI are read by the radiologist. Plain radiographic images are visualized and preliminarily interpreted by the emergency physician with the below findings:    Interpretation per the Radiologist below, if available at the time of this note:    CT ABDOMEN PELVIS WO CONTRAST Additional Contrast? None    Result Date: 12/4/2022  EXAMINATION: CT OF THE ABDOMEN AND PELVIS WITHOUT CONTRAST 12/4/2022 12:55 pm TECHNIQUE: CT of the abdomen and pelvis was performed without the administration of intravenous contrast. Multiplanar reformatted images are provided for review. Automated exposure control, iterative reconstruction, and/or weight based adjustment of the mA/kV was utilized to reduce the radiation dose to as low as reasonably achievable. COMPARISON: None. HISTORY: ORDERING SYSTEM PROVIDED HISTORY: Generalized abdominal pain, nausea and vomiting TECHNOLOGIST PROVIDED HISTORY: Generalized abdominal pain, nausea and vomiting Decision Support Exception - unselect if not a suspected or confirmed emergency medical condition->Emergency Medical Condition (MA) Reason for Exam: Nausea, vomiting x 2 weeks. FINDINGS: Lower Chest: Trace bilateral pleural effusions. Organs: Liver is normal in size and density. No focal masses identified. No evidence of intrahepatic ductal dilatation. Spleen is normal size. The gallbladder is unremarkable. Both adrenal glands are normal.  Pancreas is normal in appearance.  Right renal cyst.  The kidneys are otherwise normal in size and attenuation without evidence of hydronephrosis or renal calculi. GI/Bowel: The visualized bowel and mesentery show no mass lesions. Normal appendix. Small hiatal hernia Pelvis: No intrapelvic mass is identified. Bladder and rectum are intact. Peritoneum/Retroperitoneum: No free fluid. No lymphadenopathy. No evidence of pneumoperitoneum. Bones/Soft Tissues: Small fat containing inguinal hernias. Degenerative changes seen in the visualized spine. Wire spinal stenosis at L4-5. No acute bony abnormalities. Vascular calcifications are seen compatible with atherosclerotic disease. No acute intra-abdominal or intrapelvic abnormalities are noted. RECOMMENDATIONS: Based on MIPS measure 405, incidental abd lesions in this patient population do not warrant F/U W/O a documented medical reason. Cee Colon LABS:  Labs Reviewed   CBC WITH AUTO DIFFERENTIAL - Abnormal; Notable for the following components:       Result Value    RBC 3.79 (*)     Hemoglobin 11.3 (*)     Hematocrit 35.5 (*)     RDW 14.9 (*)     Lymphocytes 22 (*)     Monocytes 22 (*)     Eosinophils % 0 (*)     Absolute Lymph # 0.89 (*)     All other components within normal limits   URINALYSIS - Abnormal; Notable for the following components:    Turbidity UA SLIGHTLY CLOUDY (*)     Bilirubin Urine   (*)     Value: Presumptive positive. Unable to confirm due to unavailability of reagent.     Ketones, Urine 1+ (*)     Urine Hgb TRACE (*)     Protein, UA 2+ (*)     All other components within normal limits   AMYLASE - Abnormal; Notable for the following components:    Amylase 187 (*)     All other components within normal limits   BASIC METABOLIC PANEL - Abnormal; Notable for the following components:    Glucose 104 (*)     Creatinine 1.79 (*)     Est, Glom Filt Rate 42 (*)     Bun/Cre Ratio 7 (*)     Calcium 8.2 (*)     All other components within normal limits   HEPATIC FUNCTION PANEL - Abnormal; Notable for the following components:    Albumin 3.2 (*)     AST 42 (*)     All other components within normal limits   MICROSCOPIC URINALYSIS - Abnormal; Notable for the following components:    Mucus, UA 3+ (*)     All other components within normal limits   SPECIMEN REJECTION   LIPASE       All other labs were within normal range or not returned as of this dictation. EMERGENCY DEPARTMENT COURSE and DIFFERENTIAL DIAGNOSIS/MDM:   Vitals:    Vitals:    12/04/22 1338   BP: 121/70   Pulse: (!) 106   Resp: 18   Temp: 98.6 °F (37 °C)   TempSrc: Oral   SpO2: 99%   Weight: 288 lb (130.6 kg)   Height: 5' 11\" (1.803 m)       Orders Placed This Encounter   Medications    ondansetron (ZOFRAN) injection 4 mg    0.9 % sodium chloride infusion       Medical Decision Making: The patient's creatinine is up to 1.79. Amylase is slightly high at 187 but his lipase is normal.  CAT scan does not show any acute findings. He is being admitted for IV hydration and further work-up. Findings are discussed with the patient. CONSULTS:  IP CONSULT TO HOSPITALIST    PROCEDURES:  None    FINAL IMPRESSION      1. Nausea and vomiting, unspecified vomiting type    2. Acute kidney injury [facility])          DISPOSITION/PLAN   DISPOSITION Decision To Admit 12/04/2022 05:35:22 PM      PATIENT REFERRED TO:   No follow-up provider specified. DISCHARGE MEDICATIONS:     New Prescriptions    No medications on file       The care is provided during an unprecedented national emergency due to the novel coronavirus, COVID-19.     (Please note that portions of this note were completed with a voice recognition program.  Efforts were made to edit the dictations but occasionally words are mis-transcribed.)    John Linton MD  Attending Emergency Physician            John Linton MD  12/04/22 8814

## 2022-12-05 ENCOUNTER — APPOINTMENT (OUTPATIENT)
Dept: GENERAL RADIOLOGY | Age: 62
DRG: 469 | End: 2022-12-05
Payer: COMMERCIAL

## 2022-12-05 PROBLEM — E66.9 OBESITY (BMI 30-39.9): Status: ACTIVE | Noted: 2022-12-05

## 2022-12-05 PROBLEM — Z78.9 ALCOHOL USE: Status: ACTIVE | Noted: 2022-12-05

## 2022-12-05 PROBLEM — E43 SEVERE MALNUTRITION (HCC): Status: ACTIVE | Noted: 2022-12-05

## 2022-12-05 PROBLEM — D64.9 NORMOCYTIC NORMOCHROMIC ANEMIA: Status: ACTIVE | Noted: 2022-12-05

## 2022-12-05 PROBLEM — F12.10 MARIJUANA ABUSE: Status: ACTIVE | Noted: 2022-12-05

## 2022-12-05 PROBLEM — R11.2 INTRACTABLE NAUSEA AND VOMITING: Status: ACTIVE | Noted: 2022-12-05

## 2022-12-05 LAB
ALBUMIN SERPL-MCNC: 3.3 G/DL (ref 3.5–5.2)
ALP BLD-CCNC: 73 U/L (ref 40–129)
ALT SERPL-CCNC: 21 U/L (ref 5–41)
ANION GAP SERPL CALCULATED.3IONS-SCNC: 12 MMOL/L (ref 9–17)
AST SERPL-CCNC: 42 U/L
BILIRUB SERPL-MCNC: 0.2 MG/DL (ref 0.3–1.2)
BUN BLDV-MCNC: 13 MG/DL (ref 8–23)
BUN/CREAT BLD: 8 (ref 9–20)
CALCIUM SERPL-MCNC: 8.2 MG/DL (ref 8.6–10.4)
CHLORIDE BLD-SCNC: 104 MMOL/L (ref 98–107)
CO2: 24 MMOL/L (ref 20–31)
CREAT SERPL-MCNC: 1.55 MG/DL (ref 0.7–1.2)
FERRITIN: 188 NG/ML (ref 30–400)
GFR SERPL CREATININE-BSD FRML MDRD: 50 ML/MIN/1.73M2
GLUCOSE BLD-MCNC: 82 MG/DL (ref 70–99)
IRON SATURATION: 17 % (ref 20–55)
IRON: 32 UG/DL (ref 59–158)
MAGNESIUM: 1.6 MG/DL (ref 1.6–2.6)
PHOSPHORUS: 3.6 MG/DL (ref 2.5–4.5)
POTASSIUM SERPL-SCNC: 3.9 MMOL/L (ref 3.7–5.3)
SARS-COV-2, RAPID: DETECTED
SODIUM BLD-SCNC: 140 MMOL/L (ref 135–144)
SODIUM,UR: 60 MMOL/L
SPECIMEN DESCRIPTION: ABNORMAL
TOTAL IRON BINDING CAPACITY: 193 UG/DL (ref 250–450)
TOTAL PROTEIN: 6.5 G/DL (ref 6.4–8.3)
UNSATURATED IRON BINDING CAPACITY: 161 UG/DL (ref 112–347)

## 2022-12-05 PROCEDURE — 2500000003 HC RX 250 WO HCPCS: Performed by: NURSE PRACTITIONER

## 2022-12-05 PROCEDURE — APPNB30 APP NON BILLABLE TIME 0-30 MINS: Performed by: NURSE PRACTITIONER

## 2022-12-05 PROCEDURE — C9113 INJ PANTOPRAZOLE SODIUM, VIA: HCPCS | Performed by: NURSE PRACTITIONER

## 2022-12-05 PROCEDURE — A4216 STERILE WATER/SALINE, 10 ML: HCPCS | Performed by: NURSE PRACTITIONER

## 2022-12-05 PROCEDURE — 36415 COLL VENOUS BLD VENIPUNCTURE: CPT

## 2022-12-05 PROCEDURE — 6360000002 HC RX W HCPCS: Performed by: NURSE PRACTITIONER

## 2022-12-05 PROCEDURE — 83540 ASSAY OF IRON: CPT

## 2022-12-05 PROCEDURE — 82728 ASSAY OF FERRITIN: CPT

## 2022-12-05 PROCEDURE — 80053 COMPREHEN METABOLIC PANEL: CPT

## 2022-12-05 PROCEDURE — 86710 INFLUENZA VIRUS ANTIBODY: CPT

## 2022-12-05 PROCEDURE — 83735 ASSAY OF MAGNESIUM: CPT

## 2022-12-05 PROCEDURE — 2580000003 HC RX 258: Performed by: NURSE PRACTITIONER

## 2022-12-05 PROCEDURE — 6370000000 HC RX 637 (ALT 250 FOR IP): Performed by: INTERNAL MEDICINE

## 2022-12-05 PROCEDURE — 6370000000 HC RX 637 (ALT 250 FOR IP): Performed by: NURSE PRACTITIONER

## 2022-12-05 PROCEDURE — 1200000000 HC SEMI PRIVATE

## 2022-12-05 PROCEDURE — 87635 SARS-COV-2 COVID-19 AMP PRB: CPT

## 2022-12-05 PROCEDURE — 99232 SBSQ HOSP IP/OBS MODERATE 35: CPT | Performed by: INTERNAL MEDICINE

## 2022-12-05 PROCEDURE — 84100 ASSAY OF PHOSPHORUS: CPT

## 2022-12-05 PROCEDURE — 83550 IRON BINDING TEST: CPT

## 2022-12-05 RX ORDER — SCOLOPAMINE TRANSDERMAL SYSTEM 1 MG/1
1 PATCH, EXTENDED RELEASE TRANSDERMAL
Status: DISCONTINUED | OUTPATIENT
Start: 2022-12-05 | End: 2022-12-06 | Stop reason: HOSPADM

## 2022-12-05 RX ADMIN — SODIUM CHLORIDE: 9 INJECTION, SOLUTION INTRAVENOUS at 02:48

## 2022-12-05 RX ADMIN — ONDANSETRON 4 MG: 4 TABLET, ORALLY DISINTEGRATING ORAL at 20:49

## 2022-12-05 RX ADMIN — METOCLOPRAMIDE 5 MG: 5 INJECTION, SOLUTION INTRAMUSCULAR; INTRAVENOUS at 02:48

## 2022-12-05 RX ADMIN — MAGNESIUM SULFATE HEPTAHYDRATE 1000 MG: 1 INJECTION, SOLUTION INTRAVENOUS at 11:54

## 2022-12-05 RX ADMIN — SODIUM CHLORIDE, PRESERVATIVE FREE 20 MG: 5 INJECTION INTRAVENOUS at 08:18

## 2022-12-05 RX ADMIN — HEPARIN SODIUM 5000 UNITS: 5000 INJECTION INTRAVENOUS; SUBCUTANEOUS at 13:43

## 2022-12-05 RX ADMIN — HEPARIN SODIUM 5000 UNITS: 5000 INJECTION INTRAVENOUS; SUBCUTANEOUS at 20:49

## 2022-12-05 RX ADMIN — MAGNESIUM SULFATE HEPTAHYDRATE 1000 MG: 1 INJECTION, SOLUTION INTRAVENOUS at 13:43

## 2022-12-05 RX ADMIN — METOCLOPRAMIDE 5 MG: 5 INJECTION, SOLUTION INTRAMUSCULAR; INTRAVENOUS at 08:17

## 2022-12-05 RX ADMIN — HEPARIN SODIUM 5000 UNITS: 5000 INJECTION INTRAVENOUS; SUBCUTANEOUS at 05:49

## 2022-12-05 RX ADMIN — THIAMINE HYDROCHLORIDE: 100 INJECTION, SOLUTION INTRAMUSCULAR; INTRAVENOUS at 08:13

## 2022-12-05 RX ADMIN — ONDANSETRON 4 MG: 2 INJECTION INTRAMUSCULAR; INTRAVENOUS at 08:32

## 2022-12-05 RX ADMIN — Medication 40 MG: at 15:37

## 2022-12-05 ASSESSMENT — PAIN SCALES - GENERAL
PAINLEVEL_OUTOF10: 0
PAINLEVEL_OUTOF10: 0

## 2022-12-05 NOTE — PLAN OF CARE
PRE CONSULT ROUNDING NOTE  HPI  57year old male with pmh of htn gout marijuana and alcohol abuse who presented to the ED for nausea and emesis. He is positive for covid 19. Reports nausea and emesis after eating anything for the last two weeks. 15# weight loss in the last several weeks. No c/o abdominal pain. He tried pepto bismol with no improvement. Last BM was yesterday and was normal.  He did see his pcp and was told to stop his gout medication which he did one week ago with no change in his symptoms. CT abd shows no acute findings. no leucocytosis.  Hgb 11.3 ast 42 lipase normal.      Endoscopy none  Family reports no hx of liver pancreatic stomach or colon cancer no uc/crohns  Social no smoking 2-3 drinks of etoh per day smokes marijuana once a week   /83   Pulse 95   Temp 98.1 °F (36.7 °C) (Oral)   Resp 16   Ht 5' 11\" (1.803 m)   Wt 283 lb 4.8 oz (128.5 kg)   SpO2 93%   BMI 39.51 kg/m²     ROS as above meds labs imaging and past medical records were reviewed    Exam  General Appearance: alert and oriented to person, place and time, well-developed and well-nourished, in no acute distress  Skin: warm and dry, no rash or erythema  Head: normocephalic and atraumatic  Eyes: pupils equal, round, and reactive to light, extraocular eye movements intact, conjunctivae normal  ENT: hearing grossly normal bilaterally  Neck: neck supple and non tender without mass, no thyromegaly or thyroid nodules, no cervical lymphadenopathy   Pulmonary/Chest: clear to auscultation bilaterally- no wheezes, rales or rhonchi, normal air movement, no respiratory distress  Cardiovascular: normal rate, regular rhythm, normal S1 and S2, no murmurs, rubs, clicks or gallops, distal pulses intact, no carotid bruits  Abdomen: soft, obese non tender, non-distended, normal bowel sounds, no masses or organomegaly no ascites  Extremities: no cyanosis, clubbing or edema  Musculoskeletal: normal range of motion, no joint swelling, deformity or tenderness  Neurologic: no cranial nerve deficit and muscle strength normal    Assessment  Nausea and vomiting  Covid 19 positive  Weight loss  Elevated ast  anemia  Marijuana  and alcohol abuse    Plan   Will d/w md may need endoscopy  Ppi bid and will stop pepcid  Anemia profile  Recc ciwa precautions'  Stop the [address]  Formal gi consult to follow  . Myles Dueñas, APRN - CNP

## 2022-12-05 NOTE — PROGRESS NOTES
Pt admitted to room 2002 per cart in stable condition from ED.    Oriented to room and surroundings  Bed in lowest position, wheels locked, 2/4 side rails up  Call light in reach, room free of clutter, adequate lighting provided  Denies any further questions at this time  Instructed to call out with any questions/concerns/new onset of pain and/or n/v   White board updated  Continue to monitor with hourly rounding  STAY WITH ME protocol initiated   Bed alarm on/Fall Risk signs in place/Fall risk sticker to wrist band  Non-skid socks on/at bedside

## 2022-12-05 NOTE — CARE COORDINATION
Case Management Initial Discharge Plan  Shoaib Barnett,             Met with:patient to discuss discharge plans. Information verified: address, contacts, phone number, , insurance Yes  PCP: No primary care provider on file. Date of last visit: PCP list given    Insurance Provider: [facility]    Discharge Planning    Living Arrangements:  Alone   Support Systems:  Family Members, Friends/Neighbors    Home has 2 stories  2 stairs to climb to get into front door, 5 stairs to climb to reach second floor  Location of bedroom/bathroom in home - bath on main, bedroom on 2nd floor    Patient able to perform ADL's:Independent    Current Services (outpatient & in home) none  DME equipment: walker (not currently using)   DME provider: N/A    Pharmacy: [address] [place] [address] on [place]    Potential Assistance Needed:  N/A    Patient agreeable to home care: No  [place] of choice provided:  n/a    Prior SN[facility]acement and [facility]  Agreeable to SNF/Rehab: No  [place] of choice provided: n/a   Evaluation: n/a    Expected Discharge date:     Patient expects to be discharged to:   home  Follow Up Appointment: Best Day/ Time:      Transportation provider:   Transportation arrangements needed for discharge: No    Readmission Risk              Risk of Unplanned Readmission:  13             Does patient have a readmission risk score greater than 14?: No  If yes, follow-up appointment must be made within 7 days of discharge. Goal of Care:       Discharge Plan: Met with patient at bedside. Lives alone and is independent. Can drive but does not have a car and family provides transportation. Admitted for YECENIA. States has been vomiting since before . Creatinine today is 1.55. Continue to follow with no needs anticipated. [facility] PCP list given.                Electronically signed by Luis Carlos Marquez RN on 22 at 9:25 AM EST

## 2022-12-05 NOTE — ED NOTES
ED to inpatient nurses report     Chief Complaint   Patient presents with    Nausea     Pt states he has been having N/V for 2 weeks. He was seen last week here for the same complaint.  Emesis     No emesis for triage RN at this time.        Present to ED from home  LOC: alert and orientated to name, place, date  Vital signs   Vitals:    12/04/22 1338 12/04/22 1846   BP: 121/70 132/85   Pulse: (!) 106 88   Resp: 18 16   Temp: 98.6 °F (37 °C)    TempSrc: Oral    SpO2: 99% 95%   Weight: 288 lb (130.6 kg)    Height: 5' 11\" (1.803 m)       Oxygen Baseline room air    Current needs required room air   SEPSIS: no  [no] Lactate X 2 ordered (Yes or No)  [no] Antibiotics given (Yes or No)  [yes] IV Fluids ordered (Yes or No)             [yes] IV completed (Yes or No)  [no] Hourly Vital Signs (Validated)  [no] Outstanding Orders:     LDAs:   Peripheral IV 12/04/22 Right Antecubital (Active)   Site Assessment Clean, dry & intact 12/04/22 1430   Line Status Blood return noted 12/04/22 1430   Phlebitis Assessment No symptoms 12/04/22 1430   Infiltration Assessment 0 12/04/22 1430   Dressing Status New dressing applied 12/04/22 1430   Dressing Type Transparent 12/04/22 1430   Dressing Intervention New 12/04/22 1430     Mobility: Independent  Fall Risk:   no  Pending ED orders: no  Present condition: comfortable  Code Status: full  Consults: IP CONSULT TO HOSPITALIST  []  Hospitalist  Completed  [] yes [] no Who:   []  Medicine  Completed  [] yes [] No Who:   []  Cardiology  Completed  [] yes [] No Who:   []  GI   Completed  [] yes [] No Who:   []  Neurology  Completed  [] yes [] No Who:   []  Nephrology Completed  [] yes [] No Who:    []  Vascular  Completed  [] yes [] No Who:   []  Ortho  Completed  [] yes [] No Who:     []  Surgery  Completed  [] yes [] No Who:    []  Urology  Completed  [] yes [] No Who:    []  CT Surgery Completed  [] yes [] No Who:   []  Podiatry  Completed  [] yes [] No Who:    []  Other    Completed [] yes [] No Who:  Interventions: fluids  Important Events: Pt reports to ED with nausea and vomiting. Pt reports that for the past 2 weeks nothing has been able to make him feel better. Pt reports that he is able to drink water but if he has anything else he gets sick. Pt is alert and oriented, ambulatory.          Electronically signed by Natty Ramirez RN on 12/4/2022 at 7:38 PM     Natty Ramirez RN  12/04/22 1945

## 2022-12-05 NOTE — PROGRESS NOTES
End Of Shift Note  [address] ICU  Summary of shift: Pt was calm and cooperative, compliant with meds and txs. Denies pain or further needs. Call light in reach, checked on for safety.     Vitals:    Vitals:    12/05/22 1600 12/05/22 1620 12/05/22 1700 12/05/22 1800   BP:   (!) 147/81 138/88   Pulse: 93  77 73   Resp: 15  20 18   Temp:       TempSrc:       SpO2: 99%  95% 94%   Weight:       Height:  5' 11\" (1.803 m)          I&O:   Intake/Output Summary (Last 24 hours) at 12/5/2022 1843  Last data filed at 12/5/2022 1840  Gross per 24 hour   Intake 3766.24 ml   Output 1050 ml   Net 2716.24 ml       Resp Status: RA    Ventilator Settings:     / / /     Critical Care IV infusions:   sodium chloride      sodium chloride 75 mL/hr at 12/05/22 1131        LDA:   Peripheral IV 12/05/22 Right Arm (Active)   Number of days: 0

## 2022-12-05 NOTE — PROGRESS NOTES
[facility]  Office: [address], DO, Gwendolyn Medrano, DO, You Jim, DO, Nan Hopson Blood, DO, Bill Zelaya MD, Danae Munroe MD, Dion Arrieta MD, Yaquelin Kauffman MD,  Mitzy Olguin MD, Carol Juarez MD, Gina Campbell, DO, Gabriela Miles MD,  Sunshine Marcelino MD, Ellis Alcazar MD, Salima Randall, DO, Maria Victoria Esparza MD, Ayleen Hopson MD, Matteo Mackey, DO, Derrell Wolfe MD, Alayna Gil MD, Rao Ramos MD, Debbie Rouse MD, Allegra Tinoco, DO, Rita Bruner MD, Ulises Marie MD, Ayo Corona, [facility],  Charline Smallwood, CNP, Zuly Belle, CNP, Kishor Velasco, CNP,  Franki Lundborg, [facility], James Hankins, CNP, Devyn Hicks, CNP, Charisma Bruno, CNP, Prakash White, CNP, Mary Harrell, CNP, Tara Reyes PA-C, Isidro Richardson, CNS, Ravi English, CNP, Endy Quijano, [facility]         [address] [facility]    Progress Note    12/5/2022    11:28 AM    Name:   Sharonda García  MRN:     [id number]     [place]:      [de-identified]   Room:   2002/2002-02  IP Day:  1  Admit Date:  12/4/2022  2:09 PM    PCP:   No primary care provider on file. Code Status:  Full Code    Subjective:     C/C:   Chief Complaint   Patient presents with    Nausea     Pt states he has been having N/V for 2 weeks. He was seen last week here for the same complaint. Emesis     No emesis for triage RN at this time. Interval History Status: not changed. Patient still complaining of nausea and vomiting with meals. Describes bilious, nonbloody vomiting. He has been unable to keep down any oral intake since symptoms started 2 weeks ago. He does have a history of marijuana use but says he has not used since he started getting nauseated. He denies any abdominal pain. No history of diabetes. Brief History: This is a 66-year-old male who initially presented to our hospital for evaluation of persistent nausea and vomiting.   He was just admitted and discharged from hospital last week with similar complaints. Zofran did not completely improve his symptoms. Patient describes nonbloody, nonbilious vomiting that occurs with any oral intake. He has been unable to keep any food down. Review of Systems:     Constitutional:  negative for chills, fevers, sweats  Respiratory:  negative for cough, dyspnea on exertion, shortness of breath, wheezing  Cardiovascular:  negative for chest pain, chest pressure/discomfort, lower extremity edema, palpitations  Gastrointestinal:  negative for abdominal pain, constipation, diarrhea, +nausea, +vomiting  Neurological:  negative for dizziness, headache    Medications: Allergies:  No Known Allergies    Current Meds:   Scheduled Meds:    scopolamine  1 patch TransDERmal Q72H    sodium chloride flush  5-40 mL IntraVENous 2 times per day    heparin (porcine)  5,000 Units SubCUTAneous 3 times per day    famotidine (PEPCID) injection  20 mg IntraVENous BID    thiamine and folic acid IVPB   IntraVENous Daily     Continuous Infusions:    sodium chloride      sodium chloride 125 mL/hr at 12/05/22 0248     PRN Meds: sodium chloride flush, sodium chloride, potassium chloride **OR** potassium alternative oral replacement **OR** potassium chloride, magnesium sulfate, ondansetron **OR** ondansetron, acetaminophen **OR** acetaminophen, polyethylene glycol, dicyclomine    Data:     Past Medical History:   has a past medical history of Gout and Hypertension. Social History:   reports that he has never smoked. He has never used smokeless tobacco. He reports that he does not currently use alcohol. He reports current drug use. Frequency: 1.00 time per week. Drug: Marijuana Seabron Barban).      Family History:   Family History   Problem Relation Age of Onset    Cancer Mother     Alcohol Abuse Father     Liver Disease Father     Diabetes Sister        Vitals:  /83   Pulse 95   Temp 98.1 °F (36.7 °C) (Oral)   Resp 16   Ht 5' 11\" (1.803 m)   Wt 283 lb 4.8 oz (128.5 kg)   SpO2 93%   BMI 39.51 kg/m²   Temp (24hrs), Av.2 °F (36.8 °C), Min:97.9 °F (36.6 °C), Max:98.6 °F (37 °C)    No results for input(s): POCGLU in the last 72 hours. I/O (24Hr): Intake/Output Summary (Last 24 hours) at 2022 1128  Last data filed at 2022 1124  Gross per 24 hour   Intake 3092.91 ml   Output 650 ml   Net 2442.91 ml       Labs:  Hematology:  Recent Labs     22  1430   WBC 4.0   RBC 3.79*   HGB 11.3*   HCT 35.5*   MCV 93.7   MCH 29.8   MCHC 31.8   RDW 14.9*      MPV 9.5     Chemistry:  Recent Labs     22  1509 22  0556    140   K 4.2 3.9   CL 99 104   CO2 25 24   GLUCOSE 104* 82   BUN 12 13   CREATININE 1.79* 1.55*   MG  --  1.6   ANIONGAP 11 12   LABGLOM 42* 50*   CALCIUM 8.2* 8.2*   PHOS  --  3.6     Recent Labs     22  1509 22  0556   PROT 6.6 6.5   LABALBU 3.2* 3.3*   AST 42* 42*   ALT 19 21   ALKPHOS 69 73   BILITOT 0.3 0.2*   BILIDIR 0.1  --    AMYLASE 187*  --    LIPASE 47  --      ABG:No results found for: POCPH, PHART, PH, POCPCO2, CBM0SUM, PCO2, POCPO2, PO2ART, PO2, POCHCO3, RXP2YDV, HCO3, NBEA, PBEA, BEART, BE, THGBART, THB, DPK1YGI, XXBH6PAB, K3XYMFVF, O2SAT, FIO2  No results found for: SPECIAL  No results found for: CULTURE    Radiology:  CT ABDOMEN PELVIS WO CONTRAST Additional Contrast? None    Result Date: 2022  No acute intra-abdominal or intrapelvic abnormalities are noted. RECOMMENDATIONS: Based on MIPS measure 405, incidental abd lesions in this patient population do not warrant F/U W/O a documented medical reason. [employer]        Physical Examination:        General appearance:  alert, cooperative and no distress  Mental Status:  oriented to person, place and time and normal affect  Lungs:  clear to auscultation bilaterally, normal effort  Heart:  regular rate and rhythm, no murmur  Abdomen:  soft, nontender, nondistended, normal bowel sounds, no masses, hepatomegaly, splenomegaly  Extremities:  no edema, redness, tenderness in the calves  Skin:  no gross lesions, rashes, induration    Assessment:        Hospital Problems             Last Modified POA    * (Principal) YECENIA (acute kidney injury) ([facility] [address] 12/5/2022 Yes    Hypertensive disorder 12/4/2022 Yes    Dehydration 12/4/2022 Yes    Alcohol abuse (Chronic) 12/4/2022 Yes    Intractable nausea and vomiting 12/5/2022 Yes    Alcohol use 12/5/2022 Yes    Normocytic normochromic anemia 12/5/2022 Yes    Obesity (BMI 30-39.9) 12/5/2022 Yes       Plan:        Acute kidney injury due to intravascular volume depleation: improving, continue IVF. Monitor for urinary retention. Repeat labs in am.   Intractable nausea vomiting: etiology unclear, could be due to viral infection vs  marijuana use however last use was over two weeks ago. Cannot rule out PUD. Consult GI for EGD. Start scopolamine patch. Continue Pepcid . Check COVID and Influenza    ETOH Use : drinks half pint per day. NO history of withdrawal.  continue thiamine, daily Mvi  NCNC anemia: check iron studies, needs age appropriate cancer screening as outpatient. Obesity BMI 39: recommend weight loss lifestyle modification. Primary HTN : says he was told by PCP to stop taking medications. PTOT  Labs, imaging reviewed.      Vipul Rojo DO  12/5/2022  11:28 AM

## 2022-12-05 NOTE — PLAN OF CARE
Problem: Discharge Planning  Goal: Discharge to home or other facility with appropriate resources  12/5/2022 1740 by Henry Mabry RN  Outcome: Progressing  Flowsheets (Taken 12/5/2022 1500)  Discharge to home or other facility with appropriate resources: Identify barriers to discharge with patient and caregiver  12/5/2022 1124 by Jenny Daniel RN  Outcome: Progressing  Flowsheets (Taken 12/5/2022 0818)  Discharge to home or other facility with appropriate resources: Identify barriers to discharge with patient and caregiver     Problem: Pain  Goal: Verbalizes/displays adequate comfort level or baseline comfort level  12/5/2022 1740 by Henry Mabry RN  Outcome: Progressing  12/5/2022 1124 by Jenny Daniel RN  Outcome: Progressing     Problem: Safety - Adult  Goal: Free from fall injury  12/5/2022 1740 by Henry Mabry RN  Outcome: Progressing  12/5/2022 1124 by Jenny Daniel RN  Outcome: Progressing     Problem: Gastrointestinal - Adult  Goal: Minimal or absence of nausea and vomiting  12/5/2022 1740 by Henry Mabry RN  Outcome: Progressing  Flowsheets (Taken 12/5/2022 1500)  Minimal or absence of nausea and vomiting: Administer IV fluids as ordered to ensure adequate hydration  12/5/2022 1124 by Jenny Daniel RN  Outcome: Progressing  Flowsheets (Taken 12/5/2022 0818)  Minimal or absence of nausea and vomiting: Administer IV fluids as ordered to ensure adequate hydration     Problem: Metabolic/Fluid and Electrolytes - Adult  Goal: Electrolytes maintained within normal limits  12/5/2022 1740 by Henry Mabry RN  Outcome: Progressing  Flowsheets (Taken 12/5/2022 1500)  Electrolytes maintained within normal limits: Monitor labs and assess patient for signs and symptoms of electrolyte imbalances  12/5/2022 1124 by Jenny Daniel RN  Outcome: Progressing  Flowsheets (Taken 12/5/2022 0818)  Electrolytes maintained within normal limits: Monitor labs and assess patient for signs and symptoms of electrolyte imbalances Problem: Infection - Adult  Goal: Absence of infection at discharge  Outcome: Progressing     Problem: Nutrition Deficit:  Goal: Optimize nutritional status  Outcome: Progressing

## 2022-12-05 NOTE — PLAN OF CARE
Problem: Discharge Planning  Goal: Discharge to home or other facility with appropriate resources  12/5/2022 1124 by Berenice Dobson RN  Outcome: Progressing  Flowsheets (Taken 12/5/2022 0818)  Discharge to home or other facility with appropriate resources: Identify barriers to discharge with patient and caregiver  12/5/2022 0116 by Munir Vega RN  Outcome: Progressing     Problem: Pain  Goal: Verbalizes/displays adequate comfort level or baseline comfort level  12/5/2022 1124 by Bereniec Dobson RN  Outcome: Progressing  12/5/2022 0116 by Munir Vega RN  Outcome: Progressing     Problem: Safety - Adult  Goal: Free from fall injury  12/5/2022 1124 by Berenice Dobson RN  Outcome: Progressing  12/5/2022 0116 by Munir Vega RN  Outcome: Progressing     Problem: Gastrointestinal - Adult  Goal: Minimal or absence of nausea and vomiting  Outcome: Progressing  Flowsheets (Taken 12/5/2022 0818)  Minimal or absence of nausea and vomiting: Administer IV fluids as ordered to ensure adequate hydration     Problem: Metabolic/Fluid and Electrolytes - Adult  Goal: Electrolytes maintained within normal limits  Outcome: Progressing  Flowsheets (Taken 12/5/2022 0818)  Electrolytes maintained within normal limits: Monitor labs and assess patient for signs and symptoms of electrolyte imbalances No

## 2022-12-06 VITALS
BODY MASS INDEX: 39.66 KG/M2 | RESPIRATION RATE: 20 BRPM | HEIGHT: 71 IN | WEIGHT: 283.3 LBS | OXYGEN SATURATION: 96 % | HEART RATE: 75 BPM | TEMPERATURE: 97.3 F | DIASTOLIC BLOOD PRESSURE: 89 MMHG | SYSTOLIC BLOOD PRESSURE: 150 MMHG

## 2022-12-06 PROBLEM — U07.1 COVID-19 VIRUS INFECTION: Status: ACTIVE | Noted: 2022-12-06

## 2022-12-06 LAB
ANION GAP SERPL CALCULATED.3IONS-SCNC: 11 MMOL/L (ref 9–17)
BUN BLDV-MCNC: 8 MG/DL (ref 8–23)
BUN/CREAT BLD: 6 (ref 9–20)
CALCIUM SERPL-MCNC: 8.1 MG/DL (ref 8.6–10.4)
CHLORIDE BLD-SCNC: 102 MMOL/L (ref 98–107)
CO2: 25 MMOL/L (ref 20–31)
CREAT SERPL-MCNC: 1.29 MG/DL (ref 0.7–1.2)
GFR SERPL CREATININE-BSD FRML MDRD: >60 ML/MIN/1.73M2
GLUCOSE BLD-MCNC: 93 MG/DL (ref 70–99)
POTASSIUM SERPL-SCNC: 3.9 MMOL/L (ref 3.7–5.3)
SODIUM BLD-SCNC: 138 MMOL/L (ref 135–144)

## 2022-12-06 PROCEDURE — C9113 INJ PANTOPRAZOLE SODIUM, VIA: HCPCS | Performed by: NURSE PRACTITIONER

## 2022-12-06 PROCEDURE — 36415 COLL VENOUS BLD VENIPUNCTURE: CPT

## 2022-12-06 PROCEDURE — APPSS30 APP SPLIT SHARED TIME 16-30 MINUTES: Performed by: NURSE PRACTITIONER

## 2022-12-06 PROCEDURE — 97530 THERAPEUTIC ACTIVITIES: CPT

## 2022-12-06 PROCEDURE — 6360000002 HC RX W HCPCS: Performed by: NURSE PRACTITIONER

## 2022-12-06 PROCEDURE — 2580000003 HC RX 258: Performed by: NURSE PRACTITIONER

## 2022-12-06 PROCEDURE — 80048 BASIC METABOLIC PNL TOTAL CA: CPT

## 2022-12-06 PROCEDURE — 2580000003 HC RX 258: Performed by: INTERNAL MEDICINE

## 2022-12-06 PROCEDURE — 97166 OT EVAL MOD COMPLEX 45 MIN: CPT

## 2022-12-06 PROCEDURE — 97162 PT EVAL MOD COMPLEX 30 MIN: CPT

## 2022-12-06 PROCEDURE — 2500000003 HC RX 250 WO HCPCS: Performed by: NURSE PRACTITIONER

## 2022-12-06 PROCEDURE — 97535 SELF CARE MNGMENT TRAINING: CPT

## 2022-12-06 RX ORDER — PANTOPRAZOLE SODIUM 40 MG/1
40 TABLET, DELAYED RELEASE ORAL
Qty: 30 TABLET | Refills: 0 | Status: SHIPPED | OUTPATIENT
Start: 2022-12-06

## 2022-12-06 RX ORDER — NIRMATRELVIR AND RITONAVIR 300-100 MG
KIT ORAL
Qty: 30 TABLET | Refills: 0 | Status: SHIPPED | OUTPATIENT
Start: 2022-12-06 | End: 2022-12-11

## 2022-12-06 RX ADMIN — HEPARIN SODIUM 5000 UNITS: 5000 INJECTION INTRAVENOUS; SUBCUTANEOUS at 05:55

## 2022-12-06 RX ADMIN — THIAMINE HYDROCHLORIDE: 100 INJECTION, SOLUTION INTRAMUSCULAR; INTRAVENOUS at 09:35

## 2022-12-06 RX ADMIN — SODIUM CHLORIDE: 9 INJECTION, SOLUTION INTRAVENOUS at 06:03

## 2022-12-06 RX ADMIN — Medication 40 MG: at 05:54

## 2022-12-06 NOTE — PROGRESS NOTES
End Of Shift Note  [facility] ICU  Summary of shift: Patient had uneventful night and stated he slept well. Patient placed on 2L NC while sleeping d/t SpO2 72-88%. No c/o pain, 1 dose of zofran given at 2049. Patient stated he is feeling much better today than when he came into the ED. Medications administered as ordered. Vitals:    Vitals:    12/05/22 2000 12/06/22 0000 12/06/22 0400 12/06/22 0415   BP: 139/84 (!) 145/85 (!) 153/83    Pulse: 75 71 88    Resp: 18 18 20    Temp: 97.3 °F (36.3 °C) 97.8 °F (36.6 °C) 98.4 °F (36.9 °C)    TempSrc: Temporal Temporal Temporal    SpO2: 95% 96% (!) 83% 100%   Weight:       Height:            I&O:   Intake/Output Summary (Last 24 hours) at 12/6/2022 0723  Last data filed at 12/6/2022 7932  Gross per 24 hour   Intake 4545.78 ml   Output 1300 ml   Net 3245.78 ml       Resp Status: VSS, SpO2 > 90% with 2L NC while sleeping.      Ventilator Settings:     / / /     Critical Care IV infusions:   sodium chloride      sodium chloride 75 mL/hr at 12/06/22 0722        LDA:   Peripheral IV 12/05/22 Right Arm (Active)   Number of days: 0

## 2022-12-06 NOTE — PROGRESS NOTES
[facility]    Gastroenterology Daily Progress Note      Patient:   Lilian Frank   :    1960   Facility:   [facility]  Date:     2022  Consultant:   KYLE Delacruz CNP, CNP      SUBJECTIVE  57 y.o. male admitted 2022 with YECENIA (acute kidney injury) ([facility] [address] [N17.9]  Acute kidney injury ([facility] [address] [N17.9]  Nausea and vomiting, unspecified vomiting type [R11.2] and seen for nausea and vomiting. The pt was seen and examined. No c/o abdominal pain, has decreased appetite and nausea but tolerating liquids. No emesis. Remains on room air.  .        OBJECTIVE  Scheduled Meds:   scopolamine  1 patch TransDERmal Q72H    pantoprazole (PROTONIX) 40 mg injection  40 mg IntraVENous 2 times per day    sodium chloride flush  5-40 mL IntraVENous 2 times per day    heparin (porcine)  5,000 Units SubCUTAneous 3 times per day    thiamine and folic acid IVPB   IntraVENous Daily       Vital Signs:  BP (!) 153/83   Pulse 88   Temp 98.4 °F (36.9 °C) (Temporal)   Resp 20   Ht 5' 11\" (1.803 m)   Wt 283 lb 4.8 oz (128.5 kg)   SpO2 100%   BMI 39.51 kg/m²    Review of Systems - History obtained from chart review and the patient  General ROS: negative  Respiratory ROS: no cough, shortness of breath, or wheezing  Cardiovascular ROS: no chest pain or dyspnea on exertion  Gastrointestinal ROS: positive for - nausea/vomiting     Physical Exam:     General Appearance: alert and oriented to person, place and time, well-developed and well-nourished, in no acute distress  Skin: warm and dry, no rash or erythema  Head: normocephalic and atraumatic  Eyes: pupils equal, round, and reactive to light, extraocular eye movements intact, conjunctivae normal  ENT: hearing grossly normal bilaterally  Neck: neck supple and non tender without mass, no thyromegaly or thyroid nodules, no cervical lymphadenopathy   Pulmonary/Chest: clear to auscultation bilaterally- no wheezes, rales or rhonchi, normal air movement, no respiratory distress  Cardiovascular: normal rate, regular rhythm, normal S1 and S2, no murmurs, rubs, clicks or gallops, distal pulses intact, no carotid bruits  Abdomen: soft, non-tender, non-distended, normal bowel sounds, no masses or organomegaly  Extremities: no cyanosis, clubbing or edema  Musculoskeletal: normal range of motion, no joint swelling, deformity or tenderness  Neurologic: no cranial nerve deficit and muscle strength normal    Lab and Imaging Review     CBC  Recent Labs     12/04/22  1430   WBC 4.0   HGB 11.3*   HCT 35.5*   MCV 93.7          BMP  Recent Labs     12/04/22  1509 12/05/22  0556    140   K 4.2 3.9   CL 99 104   CO2 25 24   BUN 12 13   CREATININE 1.79* 1.55*   GLUCOSE 104* 82   CALCIUM 8.2* 8.2*       LFTS  Recent Labs     12/04/22  1509 12/05/22  0556   ALKPHOS 69 73   ALT 19 21   AST 42* 42*   PROT 6.6 6.5   BILITOT 0.3 0.2*   BILIDIR 0.1  --    LABALBU 3.2* 3.3*       AMYLASE/LIPASE/AMMONIA  Recent Labs     12/04/22  1509   AMYLASE 187*   LIPASE 47         ANEMIA STUDIES  Recent Labs     12/05/22  0556   LABIRON 17*   TIBC 193*   FERRITIN 188     FINDINGS:ct abd 12/4/22   Lower Chest: Trace bilateral pleural effusions. Organs: Liver is normal in size and density. No focal masses identified. No   evidence of intrahepatic ductal dilatation. Spleen is normal size. The   gallbladder is unremarkable. Both adrenal glands are normal.  Pancreas is   normal in appearance. Right renal cyst.  The kidneys are otherwise normal in   size and attenuation without evidence of hydronephrosis or renal calculi. GI/Bowel: The visualized bowel and mesentery show no mass lesions. Normal   appendix. Small hiatal hernia       Pelvis: No intrapelvic mass is identified. Bladder and rectum are intact. Peritoneum/Retroperitoneum: No free fluid. No lymphadenopathy. No evidence of   pneumoperitoneum.        Bones/Soft Tissues: Small fat containing inguinal hernias. Degenerative   changes seen in the visualized spine. Wire spinal stenosis at L4-5. No   acute bony abnormalities. Vascular calcifications are seen compatible with   atherosclerotic disease. Impression   No acute intra-abdominal or intrapelvic abnormalities are noted. ASSESSMENT  Nausea and emesis improved  -hold on egd at this time  Antiemetics prn  Advance diet as tolerated  Out pt endoscopy for weight loss and anemia  Stop the etoh and marijuana    2.covid 19 positive      Time spent reviewing chart assessing pt and d/w md around 30 minutes    This plan was formulated in collaboration with Dr. Keeley Mcmillan  . Electronically signed by: Elby Castleman, APRN - CNP on 12/6/2022 at 9:32 AM        Attending Physician Statement          I have discussed the care of Lovely Sebastian and   I have examined the patient myselft independently, and taken ros and hpi , including pertinent history and exam findings,  with the author of this note . I have reviewed the key elements of all parts of the encounter with the nurse practitioner/resident. I agree with the assessment, plan and orders as documented by the above health care provider     More than 50% of the time on this visit was spent by me   hysical Exam  Blood pressure (!) 150/89, pulse 75, temperature 97.3 °F (36.3 °C), temperature source Temporal, resp. rate 20, height 5' 11\" (1.803 m), weight 283 lb 4.8 oz (128.5 kg), SpO2 96 %.          General Appearance: alert and oriented to person, place and time, well-developed and well-nourished, in no acute distress  Skin: warm and dry, no rash or erythema  Head: normocephalic and atraumatic  Eyes: pupils equal, round, and reactive to light, extraocular eye movements intact, conjunctivae normal  ENT: hearing grossly normal bilaterally  Neck: neck supple and non tender without mass, no thyromegaly or thyroid nodules, no cervical lymphadenopathy   Pulmonary/Chest: clear to auscultation bilaterally- no wheezes, rales or rhonchi, normal air movement, no respiratory distress  Cardiovascular: normal rate, regular rhythm, normal S1 and S2, no murmurs, rubs, clicks or gallops, distal pulses intact, no carotid bruits  Abdomen: soft, non-tender, non-distended, normal bowel sounds, no masses or organomegaly  Extremities: no cyanosis, clubbing or edema  Musculoskeletal: normal range of motion, no joint swelling, deformity or tenderness  Neurologic: no cranial nerve deficit and muscle strength normal    Data Review:    Recent Labs     12/04/22  1430   WBC 4.0   HGB 11.3*   HCT 35.5*   MCV 93.7        Recent Labs     12/04/22  1509 12/05/22  0556 12/06/22  1038    140 138   K 4.2 3.9 3.9   CL 99 104 102   CO2 25 24 25   PHOS  --  3.6  --    BUN 12 13 8   CREATININE 1.79* 1.55* 1.29*     Recent Labs     12/04/22  1509 12/05/22  0556   AST 42* 42*   ALT 19 21   BILIDIR 0.1  --    BILITOT 0.3 0.2*   ALKPHOS 69 73     Recent Labs     12/04/22  1509   LIPASE 47   AMYLASE 187*     No results for input(s): PROTIME, INR in the last 72 hours. No results for input(s): PTT in the last 72 hours. No results for input(s): OCCULTBLD in the last 72 hours.   CEA:  No results found for: CEA  Ca 125:  No results found for:   Ca 19-9:  No results found for:   Ca 15-3:  No results found for:   AFP:  No components found for: AFAFP  Beta HCG:  No components found for: BHCG  Neuron Specific Enolase:  No results found for: NSE      Additional :    Outpatient endoscopy because of the weight loss and the anemia  Advised to quit drinking and marijuana use        Electronically signed by Letty Delarosa MD

## 2022-12-06 NOTE — PROGRESS NOTES
Occupational Therapy  Facility/Department: [facility] ICU  Occupational Therapy Initial Assessment    Name: Lilian Frank  : 1960  MRN: [id number]  Date of Service: 2022    RN Chary Hermosillo reports patient is medically stable for therapy treatment this date. Chart reviewed prior to treatment and patient is agreeable for therapy. All lines intact and patient positioned comfortably at end of treatment. All patient needs addressed prior to ending therapy session. Discharge Recommendations:  Patient would benefit from continued therapy after discharge  Due to recent hospitalization and medical condition, pt would benefit from additional intermittent skilled therapy at time of discharge. Please refer to the AM-PAC score for current functional status. OT Equipment Recommendations  Equipment Needed: Yes  Mobility Devices: ADL Assistive Devices  ADL Assistive Devices: Long-handled Shoe Horn;Long-handled Sponge;Reacher       Patient Diagnosis(es): The primary encounter diagnosis was Nausea and vomiting, unspecified vomiting type. A diagnosis of Acute kidney injury [facility]) was also pertinent to this visit. Past Medical History:  has a past medical history of Gout and Hypertension. Past Surgical History:  has no past surgical history on file. PER H&P: Lilian Frank is a 57 y.o. male who presents to the emergency department for nausea and vomiting. He has been having this for about 2 weeks and is not getting better. He was seen in this emergency department and had blood work done. He followed up with his family doctor a few days ago. He continues to be unable to eat. Sometimes his stomach hurts but its not hurting right now. No fever constipation or diarrhea. Assessment   Performance deficits / Impairments: Decreased functional mobility ; Decreased ADL status; Decreased safe awareness;Decreased cognition;Decreased balance;Decreased posture;Decreased endurance;Decreased high-level IADLs; Decreased strength;Decreased fine motor control  Assessment: Skilled OT services are indicated to increase I and safety during functional task to return home at prior level of function as able. Prognosis: Good  Decision Making: Medium Complexity  Activity Tolerance  Activity Tolerance: Patient Tolerated treatment well;Patient limited by fatigue  Activity Tolerance Comments: fair +. pt is limited by fatigue at this time        Plan   Occupational Therapy Plan  Times Per Week: 4-5x/wk 1x/day as ashley  Current Treatment Recommendations: Strengthening, Balance training, Functional mobility training, Endurance training, Safety education & training, Equipment evaluation, education, & procurement, Self-Care / ADL, Home management training     Restrictions  Restrictions/Precautions  Restrictions/Precautions: General Precautions, Contact Precautions, Isolation, Seizure  Required Braces or Orthoses?: No  Position Activity Restriction  Other position/activity restrictions: Covid +, droplet plus iso, telemetry, R UE IV, 2L O2 per nc    Subjective   General  Chart Reviewed: Yes  Patient assessed for rehabilitation services?: Yes  Family / Caregiver Present: No  Subjective  Subjective: Pt resting in bed, agreeable to OT eval     Social/Functional History  Social/Functional History  Lives With: Alone  Type of Home: House  Home Layout: Two level, Bed/Bath upstairs  Home Access: Stairs to enter with rails  Entrance Stairs - Number of Steps: 5  Entrance Stairs - Rails: Both  Bathroom Shower/Tub: Walk-in shower  Bathroom Toilet: Standard  Bathroom Equipment:  (no DME)  Home Equipment: Walker, rolling (no AD use at baseline)  Has the patient had two or more falls in the past year or any fall with injury in the past year?: No (denies fall hx)  ADL Assistance: Independent  Homemaking Assistance: Independent  Homemaking Responsibilities: Yes  Ambulation Assistance: Independent  Transfer Assistance: Independent  Active :  Yes (states he doesn't have a car right now, reports family/friends can get him to appts if needed)  Occupation: Retired  Type of Occupation:  at [address]: [employer]       Objective   Observation/Palpation  Posture: Good  Observation: BMI 39.5  Edema: mild BLE  Safety Devices  Type of Devices: Bed alarm in place;Call light within reach; Left in bed;Patient at risk for falls;Gait belt;Nurse notified      Balance  Sitting:  (SBA)  Standing:  (Min A with RW)  Functional Mobility   Overall Level of Assistance: Minimum assistance (Pt completed functional mobility from bed to window and back x2 with No AD)  Interventions: Verbal cues; Tactile cues (Pt given Min verbal cues for pacing self, pursed lip breathing, awareness/assist with lines and upright posture all to increase safety)       AROM: Within functional limits  PROM: Within functional limits  Strength: Within functional limits (BUE ~ 4+/5)  Coordination: Within functional limits  Tone: Normal  Sensation: Intact      ADL  Feeding: Setup  Grooming: Setup;Stand by assistance  UE Bathing: Setup;Stand by assistance  LE Bathing: Setup;Minimal assistance  UE Dressing: Setup;Minimal assistance (to tie/adjust hosp gown seated on EOB)  LE Dressing: Setup;Minimal assistance  Toileting: Contact guard assistance        Bed mobility  Supine to Sit: Stand by assistance  Sit to Supine: Stand by assistance  Scooting: Stand by assistance  Bed Mobility Comments: Pt completed bed mobility this date without significant difficulties. Pt given Min verbal cues for pursed lip breathing, awareness/assist with lines and pacing self all to increase ease. Pt denied any dizziness once seated on EOB.       Transfers  Sit to stand: Contact guard assistance  Stand to sit: Contact guard assistance  Transfer Comments: Pt given Min verbal cues for pacing self, upright posture, awareness/assist with lines, pursed lip breathing, controlled stand to sit and reaching back prior to sitting all to increase safety. Vision  Vision: Within Functional Limits  Hearing  Hearing: Within functional limits      Cognition  Overall Cognitive Status: Exceptions  Arousal/Alertness: Appropriate responses to stimuli  Following Commands: Follows multistep commands with increased time; Follows multistep commands with repitition  Attention Span: Appears intact  Memory: Appears intact  Safety Judgement: Decreased awareness of need for assistance;Decreased awareness of need for safety  Problem Solving: Decreased awareness of errors;Assistance required to identify errors made;Assistance required to correct errors made  Insights: Decreased awareness of deficits  Initiation: Does not require cues  Sequencing: Does not require cues  Cognition Comment: Pt appears to have decreased insight into current medical situation, pts O2 nc out of nose upon writers arrival, when writer asked if RN removed the O2 pt stated \"no I did. \" Pt educated on importance of leaving O2 in until medically cleared to do so. Orientation  Overall Orientation Status: Within Functional Limits                  Education Given To: Patient  Education Provided: Role of Therapy;Transfer Training;Plan of Care;Energy Conservation; Fall Prevention Strategies; ADL Adaptive Strategies  Education Provided Comments: safety in function, fall prevention/call light use, OT POC, role of OT in acute care, benefits of being OOB, pursed lip breathing tech, covid specific education, and recommendations for continued therapy  Education Method: Verbal  Barriers to Learning: None  Education Outcome: Verbalized understanding                                 AM-PAC Score        AM-PAC Inpatient Daily Activity Raw Score: 19 (12/06/22 1238)  AM-PAC Inpatient ADL T-Scale Score : 40.22 (12/06/22 1238)  ADL Inpatient CMS 0-100% Score: 42.8 (12/06/22 1238)  ADL Inpatient CMS G-Code Modifier : CK (12/06/22 1238)    Tinneti Score       Goals  Short Term Goals  Time Frame for Short Term Goals: By discharge, pt to demo  Short Term Goal 1: ADL transfer and functional mobility to SBA with use of AD as needed. Short Term Goal 2: UB ADLs to Set up and LB ADLs to SBA with use of AD as needed  Short Term Goal 3: increased B UE strength by 1/2 grade to assist with functional tasks/I with  BUE HEP with use of handouts as needed. Short Term Goal 4: bed mobility to Mod I with use of bedrails as needed. Short Term Goal 5: toileting to SBA with use of AD/grab bars as needed. Long Term Goals  Long Term Goal 1: Pt to be I with fall prevention education, EC/WS tech, recommendations for AE and covid specific education with use of handouts as needed. Patient Goals   Patient goals : To go home     Disease specific education including importance of proning, monitoring SOB and grading activity appropriately, recommendation of slow transition back home/into work. Instruction provided on proper diaphgragmatic breathing.       Therapy Time   Individual Concurrent Group Co-treatment   Time In 0802         Time Out 0840         Minutes 38          Tx time: 23 min       Idalia Barnett OT

## 2022-12-06 NOTE — PROGRESS NOTES
Patient discharged, IV removed without complications. Discharge instructions provided and explained. Prescribed script delivered from meds to beds. Patient verbalized understanding. Patient called friend, awaiting friend to arrive.

## 2022-12-06 NOTE — PROGRESS NOTES
CLINICAL PHARMACY NOTE: MEDS TO BEDS    Total # of Prescriptions Filled: 2   The following medications were delivered to the patient:  Paxlovid pack  Pantoprazole 40mg    Additional Documentation:

## 2022-12-06 NOTE — PROGRESS NOTES
Physical Therapy  Facility/Department: [facility]  Physical Therapy Initial Assessment    Name: Maria R Eric  : 1960  MRN: [id number]  Date of Service: 2022  RN [facility] reports patient is medically stable for therapy treatment this date. Chart reviewed prior to treatment and patient is agreeable for therapy. All lines intact and patient positioned comfortably at end of treatment. All patient needs addressed prior to ending therapy session. Discharge Recommendations:  Patient would benefit from continued therapy after discharge   Due to recent hospitalization and medical condition, pt would benefit from additional intermittent skilled therapy at time of discharge. Please refer to the AM-PAC score for current functional status. Per H&P: Maria R Eric is a 57 y.o. Non- / non  male who presents with Nausea (Pt states he has been having N/V for 2 weeks. He was seen last week here for the same complaint. ) and Emesis (No emesis for triage RN at this time. ) and is admitted to the hospital for the management of YECENIA (acute kidney injury) ([facility] [address] Patient presented to the ER  with complaints of vomiting. At that time he reported that he was not able to keep anything down for about a week. At that time he had a mild YECENIA of 1.61 but his electrolytes are within normal limits. CT was unremarkable. Greater than notes the patient was given fluids and Zofran. He was tolerating fluids and discharged home. Today he returns with continued nausea and vomiting with occasional abdominal pain. He denies fever, constipation or diarrhea. He had a normal BM today. He was seen by his PCP a couple of days ago and they gave him Zofran and was told to stop taking his gout medication until his N/V have improved. He has a history of alcohol abuse but states he has cut back recently and his last drink was 5 days ago.        Patient Diagnosis(es): The primary encounter diagnosis was Nausea and vomiting, unspecified vomiting type. A diagnosis of Acute kidney injury [facility]) was also pertinent to this visit. Past Medical History:  has a past medical history of Gout and Hypertension. Past Surgical History:  has no past surgical history on file. Assessment   Body Structures, Functions, Activity Limitations Requiring Skilled Therapeutic Intervention: Decreased functional mobility ; Decreased balance;Decreased endurance;Decreased safe awareness;Decreased strength  Assessment: Pt tolerated PT eval fair. Pt currently presenting w/ mild deficits in strength, balance, gait, and endurance. Pt is currently functioning below baseline and would benefit from continued skiled PT to address deficits in order to maximize independence w/ functional mobility and return to [facility] as able.   Therapy Prognosis: Excellent  Decision Making: Medium Complexity  Requires PT Follow-Up: Yes  Activity Tolerance  Activity Tolerance: Patient limited by endurance     Plan   Physcial Therapy Plan  General Plan: 5-7 times per week  Current Treatment Recommendations: Strengthening, Balance training, Functional mobility training, Stair training, Gait training, Endurance training, Home exercise program, Patient/Caregiver education & training, Safety education & training, Therapeutic activities  Safety Devices  Type of Devices: Bed alarm in place, Call light within reach, Left in bed, Gait belt, Nurse notified  Restraints  Restraints Initially in Place: No     Restrictions  Restrictions/Precautions  Restrictions/Precautions: General Precautions, Contact Precautions, Isolation, Seizure  Required Braces or Orthoses?: No  Position Activity Restriction  Other position/activity restrictions: Covid +, droplet plus iso, telemetry, R UE IV, 2L O2 per nc     Subjective   General  Patient assessed for rehabilitation services?: Yes  Response To Previous Treatment: Not applicable  Family / Caregiver Present: No  Follows Commands: Within Functional Limits  General Comment  Comments: RN and pt agreeable to therapy. Pt supine in bed upon arrival.  Pt pleasant and cooperative throughout. Subjective  Subjective: Pt reporting feeling \"better\" at time of PT eval.         Social/Functional History  Social/Functional History  Lives With: Alone  Type of Home: House  Home Layout: Two level, Bed/Bath upstairs  Home Access: Stairs to enter with rails  Entrance Stairs - Number of Steps: 5  Entrance Stairs - Rails: Both  Bathroom Shower/Tub: Walk-in shower  Bathroom Toilet: Standard  Bathroom Equipment:  (no DME)  Home Equipment: Walker, rolling (no AD use at baseline)  Has the patient had two or more falls in the past year or any fall with injury in the past year?: No (denies fall hx)  ADL Assistance: Independent  Homemaking Assistance: Independent  Homemaking Responsibilities: Yes  Ambulation Assistance: Independent  Transfer Assistance: Independent  Active : Yes (states he doesn't have a car right now, reports family/friends can get him to appts if needed)  Occupation: Retired  Type of Occupation:  at [address]: [employer]  Vision/Hearing  Vision  Vision: Within Functional Limits  Hearing  Hearing: Within functional limits    Cognition   Orientation  Overall Orientation Status: Within Functional Limits  Cognition  Overall Cognitive Status: Exceptions  Arousal/Alertness: Appropriate responses to stimuli  Following Commands: Follows multistep commands with increased time; Follows multistep commands with repitition  Attention Span: Appears intact  Memory: Appears intact  Safety Judgement: Decreased awareness of need for assistance;Decreased awareness of need for safety  Problem Solving: Decreased awareness of errors;Assistance required to identify errors made;Assistance required to correct errors made  Insights: Decreased awareness of deficits  Initiation: Does not require cues  Sequencing: Does not require cues  Cognition Comment: Pt appears to have decreased insight into current medical situation, pts O2 nc out of nose upon writers arrival, when writer asked if RN removed the O2 pt stated \"no I did. \" Pt educated on importance of leaving O2 in until medically cleared to do so. Objective   Observation/Palpation  Posture: Good  Observation: BMI 39.5  Edema: mild BLE  Gross Assessment  Sensation: Intact (denies numbness/tingling)     AROM RLE (degrees)  RLE AROM: WFL  AROM LLE (degrees)  LLE AROM : WFL  AROM RUE (degrees)  RUE AROM : WFL  AROM LUE (degrees)  LUE AROM : WFL  Strength RLE  Strength RLE: WFL  Comment: Grossly 4-/5  Strength LLE  Strength LLE: WFL  Comment: Grossly 4-/5  Strength RUE  Comment: See OT assessment for detail  Strength LUE  Comment: See OT assessment for detail          Bed mobility  Supine to Sit: Stand by assistance  Sit to Supine: Stand by assistance  Scooting: Stand by assistance  Bed Mobility Comments: Pt w/o difficulty throughout bed mobility this date. Pt requiring min verbal cueing for pursed lip breathing and safety w/ lines throughout w/ fair return demo. Upon sitting at EOB, pt denying any dizziness/lightheadedness. Transfers  Sit to Stand: Contact guard assistance  Stand to Sit: Contact guard assistance  Comment: Pt performed 2 STS transfers throughout session this date demonstrating fair steadiness throughout. Upon standing at EOB, pt performed pre-gait lateral weight shifting and standing marches to assess stability prior to initiating ambulation w/ fair steadiness noted throughout. Pt denying any dizziness/lightheadedness throughout position changes this date. Pt also demonstrating fair eccentric quad control throughout stand>sit transfers. Ambulation  Surface: Level tile  Device: No Device  Assistance: Minimal assistance  Quality of Gait: wide ULISES, moderate lateral trunk sway  Gait Deviations: Slow Adeel; Increased ULISES; Decreased step height;Decreased step length  Distance: 10ft x 3  Comments: Pt ambulating short distances within room demonstrating fair steadiness throughout. Pt requiring min verbal cueing to correct ULISES and maintain upright posture throughout w/ poor return demo. Pt twice reaching for nearby furniture and objects to steady himself throughout. More Ambulation?: No  Stairs/Curb  Stairs?: No     Balance  Posture: Good  Sitting - Static: Good  Sitting - Dynamic: Good;-  Standing - Static: Fair;+  Standing - Dynamic: Fair;-  Single Leg Stance R Le  Single Leg Stance L Le  Comments: Standing balance assessed w/o AD  Romberg/Narrow Base of Support  Eyes Open: 10 seconds  Sway: None  Eyes Closed: 5 seconds  Sway: Minimal           AM-PAC Score  AM-PAC Inpatient Mobility Raw Score : 19 (22)  AM-PAC Inpatient T-Scale Score : 45.44 (22)  Mobility Inpatient CMS 0-100% Score: 41.77 (22)  Mobility Inpatient CMS G-Code Modifier : CK (22)          Functional Outcome Measure-   Single Leg Stance Test:  2 sec. (<5 sec.= fall risk)      Goals  Short Term Goals  Time Frame for Short Term Goals: 10 visits  Short Term Goal 1: Pt to demonstrate bed mobility independently  Short Term Goal 2: Pt to perform STS transfers w/o AD independently  Short Term Goal 3: Pt to ambulate at least 100ft w/o AD independently  Short Term Goal 4: Pt to ascend/descend 12 stairs w/ handrails SBA  Short Term Goal 5: Pt to actively participate in at least 30 minutes of physical therapy for ther act, ther ex, balance, gait, and endurance training  Patient Goals   Patient Goals : To go home       Education  Patient Education  Education Given To: Patient  Education Provided: Role of Therapy;Plan of Care;Energy Conservation; Fall Prevention Strategies  Education Provided Comments: Pt educated on: purpose of acute PT eval, importance of continued mobility throughout admission, general safety awareness, fall risk prevention, pursed lip breathing, and PT POC.   Pt w/ poor return demo. Pt requires continued reinforcement of education.   Education Method: Verbal  Education Outcome: Continued education needed      Therapy Time   Individual Concurrent Group Co-treatment   Time In 3442         Time Out 9096         Minutes 41         Treatment time: 23 minutes       Mahsa Weaver PT

## 2022-12-06 NOTE — DISCHARGE SUMMARY
[facility]  Office: [address], DO, Kasia Males, DO, Jose D Loop, DO, Roberto Estrada Blood, DO, Mu Goodwin MD, Joesph Blanton MD, Casper Pearce MD, Mahsa Lopez MD,  Mag Kaye MD, Nils Fisher MD, Marva Washington, DO, Jude Camacho MD,  Shaun Cabrera MD, Antonio Urias MD, Claudean Budds, DO, Felix Jones MD, Titi Jaramillo MD, Iram Diaz, DO, Luz Maria Anna MD, Waldo Pardo MD, Blank Prajapati MD, Rox Salcedo MD, Keira Gonzalez, DO, Victoriano Roca MD, Lashawn Elkins MD, Jennifer Ribeiro, CNP,  Sasha Watts, CNP, Reena Vázquez, CNP, Rosemarie Pierce, CNP,  Walt Younger, [facility], Mila Law, CNP, Fernand Cockayne, CNP, Margot Mason, CNP, Brian Gan, CNP, Obie Childress, CNP, Anu Euceda PAChikisC, Nita Chapa, CNS, Afshan Juan, CNP, Nicole Meeks, [facility]    Discharge Summary     Patient ID: Lana Alvarenga  :  1960   MRN: [id number]     ACCOUNT:  [de-identified]   Patient's PCP: No primary care provider on file. Admit Date: 2022   Discharge Date: 2022     Length of Stay: 2  Code Status:  Full Code  Admitting Physician: Scott Rivera DO  Discharge Physician: Scott Rivera DO     Active Discharge Diagnoses:     Hospital Problem Lists:  Principal Problem:    YECENIA (acute kidney injury) ([facility]  Active Problems:    Hypertensive disorder    Dehydration    Alcohol abuse    Nausea and vomiting    Alcohol use    Normocytic normochromic anemia    Obesity (BMI 30-39. 9)    Marijuana abuse    Severe malnutrition ([facility]    COVID-19 virus infection  Resolved Problems:    * No resolved hospital problems. *      Admission Condition:  fair     Discharged Condition: stable    Hospital Stay:     Hospital Course:   Lana Alvarenga is a 57 y.o. male who was admitted for the management of  YECENIA (acute kidney injury) ([facility] , presented to ER with Nausea (Pt states he has been having N/V for 2 weeks. He was seen last week here for the same complaint. ) and Emesis (No emesis for triage RN at this time. )    This is a 27-year-old male with a history of alcoholism who presents with intractable nausea and vomiting is found to have acute kidney injury upon evaluation. Found to have volume depletion/dehydration with acute kidney injury is admitted for IV fluid resuscitation. After receiving IV fluids he had improvement in his kidney function. During his hospital stay he was tested for COVID-19 and ultimately did test positive. He remained asymptomatic as prescribed paxlovid at time of discharge. Significant therapeutic interventions: As above    Significant Diagnostic Studies:   Labs / Micro:  CBC:   Lab Results   Component Value Date/Time    WBC 4.0 12/04/2022 02:30 PM    RBC 3.79 12/04/2022 02:30 PM    HGB 11.3 12/04/2022 02:30 PM    HCT 35.5 12/04/2022 02:30 PM    MCV 93.7 12/04/2022 02:30 PM    MCH 29.8 12/04/2022 02:30 PM    MCHC 31.8 12/04/2022 02:30 PM    RDW 14.9 12/04/2022 02:30 PM     12/04/2022 02:30 PM     BMP:    Lab Results   Component Value Date/Time    GLUCOSE 93 12/06/2022 10:38 AM     12/06/2022 10:38 AM    K 3.9 12/06/2022 10:38 AM     12/06/2022 10:38 AM    CO2 25 12/06/2022 10:38 AM    ANIONGAP 11 12/06/2022 10:38 AM    BUN 8 12/06/2022 10:38 AM    CREATININE 1.29 12/06/2022 10:38 AM    BUNCRER 6 12/06/2022 10:38 AM    CALCIUM 8.1 12/06/2022 10:38 AM    LABGLOM >60 12/06/2022 10:38 AM        Radiology:  CT ABDOMEN PELVIS WO CONTRAST Additional Contrast? None    Result Date: 12/4/2022  No acute intra-abdominal or intrapelvic abnormalities are noted. RECOMMENDATIONS: Based on MIPS measure 405, incidental abd lesions in this patient population do not warrant F/U W/O a documented medical reason. .       Consultations:    Consults:     Final Specialist Recommendations/Findings:   IP CONSULT TO HOSPITALIST  IP CONSULT TO GI      The patient was seen and examined on day of discharge and this discharge summary is in conjunction with any daily progress note from day of discharge. Discharge plan:     Disposition: Home    Physician Follow Up:   PCP 1 week  No follow-up provider specified. Requiring Further Evaluation/Follow Up POST HOSPITALIZATION/Incidental Findings: None    Diet: regular diet    Activity: As tolerated    Instructions to Patient: Take medication as prescribed. Abstain from alcohol. Outpatient GI follow-up    Discharge Medications:      Medication List        START taking these medications      pantoprazole 40 MG tablet  Commonly known as: PROTONIX  Take 1 tablet by mouth every morning (before breakfast)     Paxlovid (300/100) 20 x 150 MG & 10 x 100MG Tbpk  Generic drug: nirmatrelvir/ritonavir  Take 3 tablets (two 150 mg nirmatrelvir and one 100 mg ritonavir tablets) by mouth every 12 hours for 5 days. CONTINUE taking these medications      allopurinol 300 MG tablet  Commonly known as: ZYLOPRIM     amLODIPine 10 MG tablet  Commonly known as: NORVASC     ondansetron 4 MG disintegrating tablet  Commonly known as: ZOFRAN-ODT  Take 1 tablet by mouth 3 times daily as needed for Nausea or Vomiting               Where to Get Your Medications        Information about where to get these medications is not yet available    Ask your nurse or doctor about these medications  pantoprazole 40 MG tablet  Paxlovid (300/100) 20 x 150 MG & 10 x 100MG Tbpk         No discharge procedures on file. Time Spent on discharge is   29 minutes  in patient examination, evaluation, counseling as well as medication reconciliation, prescriptions for required medications, discharge plan and follow up. Electronically signed by   Sacha Lepe DO  12/6/2022  11:53 AM      Thank you Dr. Vlad Mcclain primary care provider on file. for the opportunity to be involved in this patient's care.

## 2022-12-06 NOTE — PLAN OF CARE
Problem: Discharge Planning  Goal: Discharge to home or other facility with appropriate resources  12/6/2022 1159 by Mario Moreno RN  Outcome: Completed  12/6/2022 0633 by Jacquelyn Izaguirre RN  Outcome: Progressing  Flowsheets (Taken 12/5/2022 2000)  Discharge to home or other facility with appropriate resources:   Identify barriers to discharge with patient and caregiver   Arrange for needed discharge resources and transportation as appropriate   Identify discharge learning needs (meds, wound care, etc)     Problem: Pain  Goal: Verbalizes/displays adequate comfort level or baseline comfort level  12/6/2022 1159 by Mario Moreno RN  Outcome: Completed  12/6/2022 0633 by Jacquelyn Izaguirre RN  Outcome: Progressing  Flowsheets (Taken 12/5/2022 2000)  Verbalizes/displays adequate comfort level or baseline comfort level:   Encourage patient to monitor pain and request assistance   Assess pain using appropriate pain scale   Administer analgesics based on type and severity of pain and evaluate response   Implement non-pharmacological measures as appropriate and evaluate response   Consider cultural and social influences on pain and pain management   Notify Licensed Independent Practitioner if interventions unsuccessful or patient reports new pain     Problem: Safety - Adult  Goal: Free from fall injury  12/6/2022 1159 by Mario Moreno RN  Outcome: Completed  12/6/2022 0633 by Jacquelyn Izaguirre RN  Outcome: Progressing     Problem: Gastrointestinal - Adult  Goal: Minimal or absence of nausea and vomiting  12/6/2022 1159 by Mario Moreno RN  Outcome: Completed  12/6/2022 0633 by Jacquelyn Izaguirre RN  Outcome: Progressing  Flowsheets (Taken 12/5/2022 2000)  Minimal or absence of nausea and vomiting:   Administer IV fluids as ordered to ensure adequate hydration   Administer ordered antiemetic medications as needed   Provide nonpharmacologic comfort measures as appropriate   Advance diet as tolerated, if ordered   Nutrition consult to assist patient with adequate nutrition and appropriate food choices     Problem: Metabolic/Fluid and Electrolytes - Adult  Goal: Electrolytes maintained within normal limits  12/6/2022 1159 by Clotilde Gatica RN  Outcome: Completed  12/6/2022 0633 by Juanita Parks RN  Outcome: Progressing  Flowsheets (Taken 12/5/2022 2000)  Electrolytes maintained within normal limits:   Monitor labs and assess patient for signs and symptoms of electrolyte imbalances   Administer electrolyte replacement as ordered   Monitor response to electrolyte replacements, including repeat lab results as appropriate   Fluid restriction as ordered   Instruct patient on fluid and nutrition restrictions as appropriate     Problem: Infection - Adult  Goal: Absence of infection at discharge  12/6/2022 1159 by Clotilde Gatica RN  Outcome: Completed  12/6/2022 0633 by Juanita Parks RN  Outcome: Progressing  Flowsheets (Taken 12/5/2022 2000)  Absence of infection at discharge:   Assess and monitor for signs and symptoms of infection   Monitor lab/diagnostic results   Monitor all insertion sites i.e., indwelling lines, tubes and drains   Administer medications as ordered     Problem: Nutrition Deficit:  Goal: Optimize nutritional status  12/6/2022 1159 by Clotilde Gatica RN  Outcome: Completed  12/6/2022 0633 by Juanita Parks RN  Outcome: Progressing

## 2022-12-06 NOTE — PLAN OF CARE
Problem: Discharge Planning  Goal: Discharge to home or other facility with appropriate resources  12/6/2022 4781 by Omer Napier RN  Outcome: Progressing  Flowsheets (Taken 12/5/2022 2000)  Discharge to home or other facility with appropriate resources:   Identify barriers to discharge with patient and caregiver   Arrange for needed discharge resources and transportation as appropriate   Identify discharge learning needs (meds, wound care, etc)  12/5/2022 1740 by Fernando Jamison RN  Outcome: Progressing  Flowsheets (Taken 12/5/2022 1500)  Discharge to home or other facility with appropriate resources: Identify barriers to discharge with patient and caregiver     Problem: Pain  Goal: Verbalizes/displays adequate comfort level or baseline comfort level  12/6/2022 0633 by Omer Napier RN  Outcome: Progressing  Flowsheets (Taken 12/5/2022 2000)  Verbalizes/displays adequate comfort level or baseline comfort level:   Encourage patient to monitor pain and request assistance   Assess pain using appropriate pain scale   Administer analgesics based on type and severity of pain and evaluate response   Implement non-pharmacological measures as appropriate and evaluate response   Consider cultural and social influences on pain and pain management   Notify Licensed Independent Practitioner if interventions unsuccessful or patient reports new pain  12/5/2022 1740 by Fernando Jamison RN  Outcome: Progressing     Problem: Safety - Adult  Goal: Free from fall injury  12/6/2022 0633 by Omer Napier RN  Outcome: Progressing  12/5/2022 1740 by Fernando Jamison RN  Outcome: Progressing     Problem: Gastrointestinal - Adult  Goal: Minimal or absence of nausea and vomiting  12/6/2022 0633 by Omer Napier RN  Outcome: Progressing  Flowsheets (Taken 12/5/2022 2000)  Minimal or absence of nausea and vomiting:   Administer IV fluids as ordered to ensure adequate hydration   Administer ordered antiemetic medications as needed   Provide nonpharmacologic comfort measures as appropriate   Advance diet as tolerated, if ordered   Nutrition consult to assist patient with adequate nutrition and appropriate food choices  12/5/2022 1740 by Toya Rodriguez RN  Outcome: Progressing  Flowsheets (Taken 12/5/2022 1500)  Minimal or absence of nausea and vomiting: Administer IV fluids as ordered to ensure adequate hydration     Problem: Metabolic/Fluid and Electrolytes - Adult  Goal: Electrolytes maintained within normal limits  12/6/2022 0633 by Li Fritz RN  Outcome: Progressing  Flowsheets (Taken 12/5/2022 2000)  Electrolytes maintained within normal limits:   Monitor labs and assess patient for signs and symptoms of electrolyte imbalances   Administer electrolyte replacement as ordered   Monitor response to electrolyte replacements, including repeat lab results as appropriate   Fluid restriction as ordered   Instruct patient on fluid and nutrition restrictions as appropriate  12/5/2022 1740 by Toya Rodriguez RN  Outcome: Progressing  Flowsheets (Taken 12/5/2022 1500)  Electrolytes maintained within normal limits: Monitor labs and assess patient for signs and symptoms of electrolyte imbalances     Problem: Infection - Adult  Goal: Absence of infection at discharge  12/6/2022 8609 by Li Fritz RN  Outcome: Progressing  Flowsheets (Taken 12/5/2022 2000)  Absence of infection at discharge:   Assess and monitor for signs and symptoms of infection   Monitor lab/diagnostic results   Monitor all insertion sites i.e., indwelling lines, tubes and drains   Administer medications as ordered  12/5/2022 1740 by Toya Rodriguez RN  Outcome: Progressing     Problem: Nutrition Deficit:  Goal: Optimize nutritional status  12/6/2022 0633 by Li Fritz RN  Outcome: Progressing  12/5/2022 1740 by Toya Rodriguez RN  Outcome: Progressing

## 2022-12-06 NOTE — PROGRESS NOTES
[facility]  Office: [address], DO, Danitza Ware, DO, Kranthi Robertson, DO, [facility] Blood, DO, Katheryn Shepard MD, Iris Hodges MD, Adrian Smart MD, Alberto Benson MD,  Suresh Palmer MD, Tima Packer MD, Yonathan Boone, DO, Jessica Hopkins MD,  Katarzyna Trivedi MD, Prerna Charles MD, Jarett Doll, DO, Ryder Aponte MD, Gera Sarmiento MD, Hamlet Vazquez, DO, Idris Darling MD, Quinton Strauss MD, Eligio Bender MD, Molly Mahoney MD, Elijah Jang, DO, Cherelle Hernandez MD, Blas Green MD, Tutu Hammond, NAIDA,  Devi Gomez, CNP, Sahra Salas, CNP, Rigo Perez, CNP,  Burnett Osgood, [facility], Elizabeth Post, CNP, Cristina Safe, CNP, Zulema Dunbar, CNP, Steve Rae, CNP, Daniella Rodriguez, [facility], RACHANA OvalleC, Judy Renee, [facility], Deena Ibarra, CNP, Ariadna Diane, [facility]    Progress Note    12/6/2022    11:47 AM    Name:   Danica Peralta  MRN:     [id number]     [place]:      [de-identified]   Room:   [address] [place] [address] Day:  2  Admit Date:  12/4/2022  2:09 PM    PCP:   No primary care provider on file. Code Status:  Full Code    Subjective:     C/C:   Chief Complaint   Patient presents with    Nausea     Pt states he has been having N/V for 2 weeks. He was seen last week here for the same complaint. Emesis     No emesis for triage RN at this time. Interval History Status: improved. Patient with improving appetite and decrease in emesis, still admitted nausea. Denies any chest pain, shortness of breath, fever chills or acute complaints. Brief History: This is a 57-year-old male that presents with intractable nausea vomiting and upon evaluation was found to have dehydration and associated acute kidney injury. He is admitted and placed on Zofran as needed for nausea and vomiting as well as IV fluid resuscitation. With fluid resuscitation his kidney function did improve. He had incidental positive COVID-19 test while hospitalized, was asymptomatic. Patient is unvaccinated. Review of Systems:     Constitutional:  negative for chills, fevers, sweats  Respiratory:  negative for cough, dyspnea on exertion, shortness of breath, wheezing  Cardiovascular:  negative for chest pain, chest pressure/discomfort, lower extremity edema, palpitations  Gastrointestinal:  negative for abdominal pain, constipation, diarrhea, positive for improving nausea, vomiting  Neurological:  negative for dizziness, headache    Medications: Allergies:  No Known Allergies    Current Meds:   Scheduled Meds:    scopolamine  1 patch TransDERmal Q72H    pantoprazole (PROTONIX) 40 mg injection  40 mg IntraVENous 2 times per day    sodium chloride flush  5-40 mL IntraVENous 2 times per day    heparin (porcine)  5,000 Units SubCUTAneous 3 times per day    thiamine and folic acid IVPB   IntraVENous Daily     Continuous Infusions:    sodium chloride       PRN Meds: sodium chloride flush, sodium chloride, potassium chloride **OR** potassium alternative oral replacement **OR** potassium chloride, magnesium sulfate, ondansetron **OR** ondansetron, acetaminophen **OR** acetaminophen, polyethylene glycol, dicyclomine    Data:     Past Medical History:   has a past medical history of Gout and Hypertension. Social History:   reports that he has never smoked. He has never used smokeless tobacco. He reports that he does not currently use alcohol. He reports current drug use. Frequency: 1.00 time per week. Drug: Marijuana Shellye Burkitt).      Family History:   Family History   Problem Relation Age of Onset    Cancer Mother     Alcohol Abuse Father     Liver Disease Father     Diabetes Sister        Vitals:  BP (!) 145/90   Pulse 75   Temp 97.3 °F (36.3 °C) (Temporal)   Resp 17   Ht 5' 11\" (1.803 m)   Wt 283 lb 4.8 oz (128.5 kg)   SpO2 100%   BMI 39.51 kg/m²   Temp (24hrs), Av.8 °F (36.6 °C), Min:97.3 °F (36.3 °C), Max:98.4 °F (36.9 °C)    No results for input(s): POCGLU in the last 72 hours. I/O (24Hr): Intake/Output Summary (Last 24 hours) at 12/6/2022 1147  Last data filed at 12/6/2022 0900  Gross per 24 hour   Intake 1452.87 ml   Output 1450 ml   Net 2.87 ml       Labs:  Hematology:  Recent Labs     12/04/22  1430   WBC 4.0   RBC 3.79*   HGB 11.3*   HCT 35.5*   MCV 93.7   MCH 29.8   MCHC 31.8   RDW 14.9*      MPV 9.5     Chemistry:  Recent Labs     12/04/22  1509 12/05/22  0556 12/06/22  1038    140 138   K 4.2 3.9 3.9   CL 99 104 102   CO2 25 24 25   GLUCOSE 104* 82 93   BUN 12 13 8   CREATININE 1.79* 1.55* 1.29*   MG  --  1.6  --    ANIONGAP 11 12 11   LABGLOM 42* 50* >60   CALCIUM 8.2* 8.2* 8.1*   PHOS  --  3.6  --      Recent Labs     12/04/22  1509 12/05/22  0556   PROT 6.6 6.5   LABALBU 3.2* 3.3*   AST 42* 42*   ALT 19 21   ALKPHOS 69 73   BILITOT 0.3 0.2*   BILIDIR 0.1  --    AMYLASE 187*  --    LIPASE 47  --      ABG:No results found for: POCPH, PHART, PH, POCPCO2, RJS2WXW, PCO2, POCPO2, PO2ART, PO2, POCHCO3, NIG8NIC, HCO3, NBEA, PBEA, BEART, BE, THGBART, THB, AEK1QNF, FUKM1BZO, X9NXPTFC, O2SAT, FIO2  No results found for: SPECIAL  No results found for: CULTURE    Radiology:  CT ABDOMEN PELVIS WO CONTRAST Additional Contrast? None    Result Date: 12/4/2022  No acute intra-abdominal or intrapelvic abnormalities are noted. RECOMMENDATIONS: Based on MIPS measure 405, incidental abd lesions in this patient population do not warrant F/U W/O a documented medical reason. Jihan Gaming        Physical Examination:        General appearance:  alert, cooperative and no distress  Mental Status:  oriented to person, place and time and normal affect  Lungs:  clear to auscultation bilaterally, normal effort  Heart:  regular rate and rhythm, no murmur  Abdomen:  soft, nontender, nondistended, normal bowel sounds, no masses, hepatomegaly, splenomegaly  Extremities:  no edema, redness, tenderness in the calves  Skin:  no gross lesions, rashes, induration    Assessment:        Hospital Problems             Last Modified POA    * (Principal) YECENIA (acute kidney injury) ([facility] 12/5/2022 Yes    Hypertensive disorder 12/4/2022 Yes    Dehydration 12/4/2022 Yes    Alcohol abuse (Chronic) 12/4/2022 Yes    Nausea and vomiting 12/5/2022 Yes    Alcohol use 12/5/2022 Yes    Normocytic normochromic anemia 12/5/2022 Yes    Obesity (BMI 30-39.9) 12/5/2022 Yes    Marijuana abuse 12/5/2022 Yes    Severe malnutrition ([facility] 12/5/2022 Yes    COVID-19 virus infection 12/6/2022 Yes       Plan:        Check BMP today  Stop IV fluids if kidney function improving  Monitor for COVID symptoms  Monitoring control blood pressure  Alcohol cessation  Continue Zofran as needed  Nutritional supplements  Discharge planning    Lovely Nance DO  12/6/2022  11:47 AM

## 2022-12-07 NOTE — CONSULTS
PRE CONSULT ROUNDING NOTE  HPI  57year old male with pmh of htn gout marijuana and alcohol abuse who presented to the ED for nausea and emesis. He is positive for covid 19. Reports nausea and emesis after eating anything for the last two weeks. 15# weight loss in the last several weeks. No c/o abdominal pain. He tried pepto bismol with no improvement. Last BM was yesterday and was normal.  He did see his pcp and was told to stop his gout medication which he did one week ago with no change in his symptoms. CT abd shows no acute findings. no leucocytosis.  Hgb 11.3 ast 42 lipase normal.      Endoscopy none  Family reports no hx of liver pancreatic stomach or colon cancer no uc/crohns  Social no smoking 2-3 drinks of etoh per day smokes marijuana once a week   BP (!) 150/89   Pulse 75   Temp 97.3 °F (36.3 °C) (Temporal)   Resp 20   Ht 5' 11\" (1.803 m)   Wt 283 lb 4.8 oz (128.5 kg)   SpO2 96%   BMI 39.51 kg/m²     ROS as above meds labs imaging and past medical records were reviewed    Exam  General Appearance: alert and oriented to person, place and time, well-developed and well-nourished, in no acute distress  Skin: warm and dry, no rash or erythema  Head: normocephalic and atraumatic  Eyes: pupils equal, round, and reactive to light, extraocular eye movements intact, conjunctivae normal  ENT: hearing grossly normal bilaterally  Neck: neck supple and non tender without mass, no thyromegaly or thyroid nodules, no cervical lymphadenopathy   Pulmonary/Chest: clear to auscultation bilaterally- no wheezes, rales or rhonchi, normal air movement, no respiratory distress  Cardiovascular: normal rate, regular rhythm, normal S1 and S2, no murmurs, rubs, clicks or gallops, distal pulses intact, no carotid bruits  Abdomen: soft, obese non tender, non-distended, normal bowel sounds, no masses or organomegaly no ascites  Extremities: no cyanosis, clubbing or edema  Musculoskeletal: normal range of motion, no joint swelling, deformity or tenderness  Neurologic: no cranial nerve deficit and muscle strength normal    Assessment  Nausea and vomiting  Covid 19 positive  Weight loss  Elevated ast  anemia  Marijuana  and alcohol abuse    Plan  Will need endoscopy based on progress  Ppi bid and will stop pepcid  Anemia profile  Recc palmira precautions'  Stop the [address]  Formal gi consult to follow

## 2022-12-09 LAB
INFLUENZA A ANTIBODY IGG: 2.31 IV
INFLUENZA A ANTIBODY IGM: 0.14 IV
INFLUENZA B ANTIBODY, IGG: 1.33 IV
INFLUENZA B ANTIBODY, IGM: 0.07 IV